# Patient Record
Sex: MALE | Race: NATIVE HAWAIIAN OR OTHER PACIFIC ISLANDER | HISPANIC OR LATINO | URBAN - METROPOLITAN AREA
[De-identification: names, ages, dates, MRNs, and addresses within clinical notes are randomized per-mention and may not be internally consistent; named-entity substitution may affect disease eponyms.]

---

## 2024-03-27 ENCOUNTER — INPATIENT (INPATIENT)
Facility: HOSPITAL | Age: 69
LOS: 3 days | Discharge: HOME | DRG: 872 | End: 2024-03-31
Attending: HOSPITALIST | Admitting: HOSPITALIST
Payer: MEDICAID

## 2024-03-27 VITALS
RESPIRATION RATE: 22 BRPM | WEIGHT: 179.9 LBS | OXYGEN SATURATION: 94 % | DIASTOLIC BLOOD PRESSURE: 77 MMHG | TEMPERATURE: 98 F | SYSTOLIC BLOOD PRESSURE: 134 MMHG | HEART RATE: 127 BPM | HEIGHT: 63 IN

## 2024-03-27 PROCEDURE — 99285 EMERGENCY DEPT VISIT HI MDM: CPT | Mod: 25

## 2024-03-28 DIAGNOSIS — A41.9 SEPSIS, UNSPECIFIED ORGANISM: ICD-10-CM

## 2024-03-28 DIAGNOSIS — E78.5 HYPERLIPIDEMIA, UNSPECIFIED: ICD-10-CM

## 2024-03-28 DIAGNOSIS — I10 ESSENTIAL (PRIMARY) HYPERTENSION: ICD-10-CM

## 2024-03-28 DIAGNOSIS — R10.9 UNSPECIFIED ABDOMINAL PAIN: ICD-10-CM

## 2024-03-28 DIAGNOSIS — K52.9 NONINFECTIVE GASTROENTERITIS AND COLITIS, UNSPECIFIED: ICD-10-CM

## 2024-03-28 LAB
ALBUMIN SERPL ELPH-MCNC: 4.5 G/DL — SIGNIFICANT CHANGE UP (ref 3.3–5)
ALP SERPL-CCNC: 97 U/L — SIGNIFICANT CHANGE UP (ref 40–120)
ALT FLD-CCNC: 86 U/L — HIGH (ref 10–45)
ANION GAP SERPL CALC-SCNC: 19 MMOL/L — HIGH (ref 5–17)
APPEARANCE UR: CLEAR — SIGNIFICANT CHANGE UP
APTT BLD: 29.9 SEC — SIGNIFICANT CHANGE UP (ref 24.5–35.6)
AST SERPL-CCNC: 43 U/L — HIGH (ref 10–40)
BASOPHILS # BLD AUTO: 0.04 K/UL — SIGNIFICANT CHANGE UP (ref 0–0.2)
BASOPHILS NFR BLD AUTO: 0.4 % — SIGNIFICANT CHANGE UP (ref 0–2)
BILIRUB SERPL-MCNC: 1.2 MG/DL — SIGNIFICANT CHANGE UP (ref 0.2–1.2)
BILIRUB UR-MCNC: NEGATIVE — SIGNIFICANT CHANGE UP
BUN SERPL-MCNC: 21 MG/DL — SIGNIFICANT CHANGE UP (ref 7–23)
CALCIUM SERPL-MCNC: 9.4 MG/DL — SIGNIFICANT CHANGE UP (ref 8.4–10.5)
CAMPYLOBACTER DNA SPEC NAA+PROBE: DETECTED
CHLORIDE SERPL-SCNC: 99 MMOL/L — SIGNIFICANT CHANGE UP (ref 96–108)
CO2 SERPL-SCNC: 17 MMOL/L — LOW (ref 22–31)
COLOR SPEC: YELLOW — SIGNIFICANT CHANGE UP
CREAT SERPL-MCNC: 1.36 MG/DL — HIGH (ref 0.5–1.3)
DIFF PNL FLD: NEGATIVE — SIGNIFICANT CHANGE UP
EC EAEA GENE STL QL NAA+PROBE: DETECTED
EGFR: 57 ML/MIN/1.73M2 — LOW
EOSINOPHIL # BLD AUTO: 0 K/UL — SIGNIFICANT CHANGE UP (ref 0–0.5)
EOSINOPHIL NFR BLD AUTO: 0 % — SIGNIFICANT CHANGE UP (ref 0–6)
EPEC DNA STL QL NAA+PROBE: DETECTED
ETEC DNA STL QL NAA+PROBE: DETECTED
FLUAV AG NPH QL: SIGNIFICANT CHANGE UP
FLUBV AG NPH QL: SIGNIFICANT CHANGE UP
GAS PNL BLDV: SIGNIFICANT CHANGE UP
GI PCR PANEL: DETECTED
GLUCOSE SERPL-MCNC: 124 MG/DL — HIGH (ref 70–99)
GLUCOSE UR QL: NEGATIVE MG/DL — SIGNIFICANT CHANGE UP
HCT VFR BLD CALC: 51.7 % — HIGH (ref 39–50)
HGB BLD-MCNC: 18.1 G/DL — HIGH (ref 13–17)
IMM GRANULOCYTES NFR BLD AUTO: 1.2 % — HIGH (ref 0–0.9)
INR BLD: 1.14 RATIO — SIGNIFICANT CHANGE UP (ref 0.85–1.18)
KETONES UR-MCNC: NEGATIVE MG/DL — SIGNIFICANT CHANGE UP
LEUKOCYTE ESTERASE UR-ACNC: NEGATIVE — SIGNIFICANT CHANGE UP
LYMPHOCYTES # BLD AUTO: 0.51 K/UL — LOW (ref 1–3.3)
LYMPHOCYTES # BLD AUTO: 4.7 % — LOW (ref 13–44)
MCHC RBC-ENTMCNC: 31.9 PG — SIGNIFICANT CHANGE UP (ref 27–34)
MCHC RBC-ENTMCNC: 35 GM/DL — SIGNIFICANT CHANGE UP (ref 32–36)
MCV RBC AUTO: 91 FL — SIGNIFICANT CHANGE UP (ref 80–100)
MONOCYTES # BLD AUTO: 0.51 K/UL — SIGNIFICANT CHANGE UP (ref 0–0.9)
MONOCYTES NFR BLD AUTO: 4.7 % — SIGNIFICANT CHANGE UP (ref 2–14)
NEUTROPHILS # BLD AUTO: 9.64 K/UL — HIGH (ref 1.8–7.4)
NEUTROPHILS NFR BLD AUTO: 89 % — HIGH (ref 43–77)
NITRITE UR-MCNC: NEGATIVE — SIGNIFICANT CHANGE UP
NRBC # BLD: 0 /100 WBCS — SIGNIFICANT CHANGE UP (ref 0–0)
PH UR: 5.5 — SIGNIFICANT CHANGE UP (ref 5–8)
PLATELET # BLD AUTO: 236 K/UL — SIGNIFICANT CHANGE UP (ref 150–400)
POTASSIUM SERPL-MCNC: 3.7 MMOL/L — SIGNIFICANT CHANGE UP (ref 3.5–5.3)
POTASSIUM SERPL-SCNC: 3.7 MMOL/L — SIGNIFICANT CHANGE UP (ref 3.5–5.3)
PROT SERPL-MCNC: 7.8 G/DL — SIGNIFICANT CHANGE UP (ref 6–8.3)
PROT UR-MCNC: 30 MG/DL
PROTHROM AB SERPL-ACNC: 12.5 SEC — SIGNIFICANT CHANGE UP (ref 9.5–13)
RBC # BLD: 5.68 M/UL — SIGNIFICANT CHANGE UP (ref 4.2–5.8)
RBC # FLD: 12.5 % — SIGNIFICANT CHANGE UP (ref 10.3–14.5)
RSV RNA NPH QL NAA+NON-PROBE: SIGNIFICANT CHANGE UP
SALMONELLA DNA STL QL NAA+PROBE: DETECTED
SARS-COV-2 RNA SPEC QL NAA+PROBE: SIGNIFICANT CHANGE UP
SODIUM SERPL-SCNC: 135 MMOL/L — SIGNIFICANT CHANGE UP (ref 135–145)
SP GR SPEC: >1.03 — HIGH (ref 1–1.03)
UROBILINOGEN FLD QL: 0.2 MG/DL — SIGNIFICANT CHANGE UP (ref 0.2–1)
WBC # BLD: 10.83 K/UL — HIGH (ref 3.8–10.5)
WBC # FLD AUTO: 10.83 K/UL — HIGH (ref 3.8–10.5)

## 2024-03-28 PROCEDURE — 99222 1ST HOSP IP/OBS MODERATE 55: CPT

## 2024-03-28 PROCEDURE — 74177 CT ABD & PELVIS W/CONTRAST: CPT | Mod: 26,MC

## 2024-03-28 PROCEDURE — 99223 1ST HOSP IP/OBS HIGH 75: CPT

## 2024-03-28 PROCEDURE — 71045 X-RAY EXAM CHEST 1 VIEW: CPT | Mod: 26

## 2024-03-28 RX ORDER — AZITHROMYCIN 500 MG/1
TABLET, FILM COATED ORAL
Refills: 0 | Status: DISCONTINUED | OUTPATIENT
Start: 2024-03-28 | End: 2024-03-31

## 2024-03-28 RX ORDER — LANOLIN ALCOHOL/MO/W.PET/CERES
3 CREAM (GRAM) TOPICAL AT BEDTIME
Refills: 0 | Status: DISCONTINUED | OUTPATIENT
Start: 2024-03-28 | End: 2024-03-31

## 2024-03-28 RX ORDER — AZITHROMYCIN 500 MG/1
500 TABLET, FILM COATED ORAL EVERY 24 HOURS
Refills: 0 | Status: DISCONTINUED | OUTPATIENT
Start: 2024-03-29 | End: 2024-03-31

## 2024-03-28 RX ORDER — SODIUM CHLORIDE 9 MG/ML
2500 INJECTION INTRAMUSCULAR; INTRAVENOUS; SUBCUTANEOUS ONCE
Refills: 0 | Status: DISCONTINUED | OUTPATIENT
Start: 2024-03-28 | End: 2024-03-28

## 2024-03-28 RX ORDER — ACETAMINOPHEN 500 MG
1000 TABLET ORAL ONCE
Refills: 0 | Status: COMPLETED | OUTPATIENT
Start: 2024-03-28 | End: 2024-03-28

## 2024-03-28 RX ORDER — ONDANSETRON 8 MG/1
4 TABLET, FILM COATED ORAL ONCE
Refills: 0 | Status: COMPLETED | OUTPATIENT
Start: 2024-03-28 | End: 2024-03-28

## 2024-03-28 RX ORDER — KETOROLAC TROMETHAMINE 30 MG/ML
15 SYRINGE (ML) INJECTION ONCE
Refills: 0 | Status: DISCONTINUED | OUTPATIENT
Start: 2024-03-28 | End: 2024-03-28

## 2024-03-28 RX ORDER — FAMOTIDINE 10 MG/ML
20 INJECTION INTRAVENOUS ONCE
Refills: 0 | Status: COMPLETED | OUTPATIENT
Start: 2024-03-28 | End: 2024-03-28

## 2024-03-28 RX ORDER — AZITHROMYCIN 500 MG/1
500 TABLET, FILM COATED ORAL ONCE
Refills: 0 | Status: COMPLETED | OUTPATIENT
Start: 2024-03-28 | End: 2024-03-28

## 2024-03-28 RX ORDER — ACETAMINOPHEN 500 MG
650 TABLET ORAL EVERY 6 HOURS
Refills: 0 | Status: DISCONTINUED | OUTPATIENT
Start: 2024-03-28 | End: 2024-03-31

## 2024-03-28 RX ORDER — SODIUM CHLORIDE 9 MG/ML
1000 INJECTION, SOLUTION INTRAVENOUS
Refills: 0 | Status: COMPLETED | OUTPATIENT
Start: 2024-03-28 | End: 2024-03-28

## 2024-03-28 RX ORDER — PIPERACILLIN AND TAZOBACTAM 4; .5 G/20ML; G/20ML
3.38 INJECTION, POWDER, LYOPHILIZED, FOR SOLUTION INTRAVENOUS EVERY 8 HOURS
Refills: 0 | Status: DISCONTINUED | OUTPATIENT
Start: 2024-03-28 | End: 2024-03-28

## 2024-03-28 RX ORDER — INFLUENZA VIRUS VACCINE 15; 15; 15; 15 UG/.5ML; UG/.5ML; UG/.5ML; UG/.5ML
0.7 SUSPENSION INTRAMUSCULAR ONCE
Refills: 0 | Status: DISCONTINUED | OUTPATIENT
Start: 2024-03-28 | End: 2024-03-31

## 2024-03-28 RX ORDER — SODIUM CHLORIDE 9 MG/ML
1000 INJECTION, SOLUTION INTRAVENOUS ONCE
Refills: 0 | Status: COMPLETED | OUTPATIENT
Start: 2024-03-28 | End: 2024-03-28

## 2024-03-28 RX ORDER — ONDANSETRON 8 MG/1
4 TABLET, FILM COATED ORAL EVERY 8 HOURS
Refills: 0 | Status: DISCONTINUED | OUTPATIENT
Start: 2024-03-28 | End: 2024-03-31

## 2024-03-28 RX ORDER — SODIUM CHLORIDE 9 MG/ML
2000 INJECTION INTRAMUSCULAR; INTRAVENOUS; SUBCUTANEOUS ONCE
Refills: 0 | Status: COMPLETED | OUTPATIENT
Start: 2024-03-28 | End: 2024-03-28

## 2024-03-28 RX ORDER — PIPERACILLIN AND TAZOBACTAM 4; .5 G/20ML; G/20ML
3.38 INJECTION, POWDER, LYOPHILIZED, FOR SOLUTION INTRAVENOUS ONCE
Refills: 0 | Status: COMPLETED | OUTPATIENT
Start: 2024-03-28 | End: 2024-03-28

## 2024-03-28 RX ADMIN — Medication 650 MILLIGRAM(S): at 20:24

## 2024-03-28 RX ADMIN — Medication 400 MILLIGRAM(S): at 01:01

## 2024-03-28 RX ADMIN — SODIUM CHLORIDE 1000 MILLILITER(S): 9 INJECTION, SOLUTION INTRAVENOUS at 03:30

## 2024-03-28 RX ADMIN — PIPERACILLIN AND TAZOBACTAM 25 GRAM(S): 4; .5 INJECTION, POWDER, LYOPHILIZED, FOR SOLUTION INTRAVENOUS at 05:58

## 2024-03-28 RX ADMIN — Medication 15 MILLIGRAM(S): at 04:10

## 2024-03-28 RX ADMIN — PIPERACILLIN AND TAZOBACTAM 25 GRAM(S): 4; .5 INJECTION, POWDER, LYOPHILIZED, FOR SOLUTION INTRAVENOUS at 13:47

## 2024-03-28 RX ADMIN — SODIUM CHLORIDE 2000 MILLILITER(S): 9 INJECTION INTRAMUSCULAR; INTRAVENOUS; SUBCUTANEOUS at 01:01

## 2024-03-28 RX ADMIN — PIPERACILLIN AND TAZOBACTAM 200 GRAM(S): 4; .5 INJECTION, POWDER, LYOPHILIZED, FOR SOLUTION INTRAVENOUS at 01:19

## 2024-03-28 RX ADMIN — Medication 650 MILLIGRAM(S): at 20:54

## 2024-03-28 RX ADMIN — Medication 15 MILLIGRAM(S): at 04:40

## 2024-03-28 RX ADMIN — ONDANSETRON 4 MILLIGRAM(S): 8 TABLET, FILM COATED ORAL at 01:01

## 2024-03-28 RX ADMIN — Medication 1000 MILLIGRAM(S): at 01:31

## 2024-03-28 RX ADMIN — AZITHROMYCIN 500 MILLIGRAM(S): 500 TABLET, FILM COATED ORAL at 18:14

## 2024-03-28 RX ADMIN — Medication 650 MILLIGRAM(S): at 12:21

## 2024-03-28 RX ADMIN — Medication 650 MILLIGRAM(S): at 11:51

## 2024-03-28 RX ADMIN — SODIUM CHLORIDE 100 MILLILITER(S): 9 INJECTION, SOLUTION INTRAVENOUS at 05:58

## 2024-03-28 RX ADMIN — FAMOTIDINE 20 MILLIGRAM(S): 10 INJECTION INTRAVENOUS at 01:01

## 2024-03-28 NOTE — ED PROVIDER NOTE - PROGRESS NOTE DETAILS
Elías PGY3: pt remains comfortable, hasn't made any diarrhea yet. +colitis on CT, repeat lact improving. More caroline JENKINS.

## 2024-03-28 NOTE — ED PROVIDER NOTE - OBJECTIVE STATEMENT
67yo [place] speaking M visiting from [facility] with no known pmh presents for vomiting/diarrhea. Yesterday started anorexia, watery diarrhea and abd bloating. All night was moving watery stools, then developed nausea/vomiting so family brought him to ED. mild epigastric pain but no other localizing tpp. +distension. No cp, sob, cough/uri sxs. Arrived from the visit 3/24

## 2024-03-28 NOTE — CONSULT NOTE ADULT - SUBJECTIVE AND OBJECTIVE BOX
Patient is a 68 year old  Occitan speaking male visiting from [facility] with pmh of HTN , HLD , presents for nausea vomiting and diarrhea over the past day.   Per son,   over the past day patient has been having intermittent green non bloody  foul smelling watery diarrhea  associated with poor appetite , decreased oral intake, abdominal cramping and bloating relieved with bowel movement , he also has a few episodes of non bloody emesis during this time. Patient also reports fever and chills as well as lightheadedness. He reports no sick contacts. He recently arrived from [facility] on 3/24.  He had a colonoscopy about 4 months ago reportedly without significant abnormalities.     ED Tmax 101.9 WBC 10.8    UA 11 WBC  Limited RVP neg    GI PCR+ campylobacter, salmonella, EAEC, EPEC, ETEC     Stool cx pending  Stool Ova/cyst pending    Bcx Pending    CT A/P    Wall thickening of the cecum, transverse colon, and descending colon with   fluid throughout the colon suggestive of colitis, possibly   infectious/inflammatory in etiology.    prior hospital charts reviewed [  ]  primary team notes reviewed [x  ]  other consultant notes reviewed [  ]    PAST MEDICAL & SURGICAL HISTORY:  HLD (hyperlipidemia)      HTN (hypertension)      No significant past surgical history          Allergies  No Known Allergies    ANTIMICROBIALS (past 90 days)  MEDICATIONS  (STANDING):  piperacillin/tazobactam IVPB..   25 mL/Hr IV Intermittent (03-28-24 @ 13:47)   25 mL/Hr IV Intermittent (03-28-24 @ 05:58)    piperacillin/tazobactam IVPB...   200 mL/Hr IV Intermittent (03-28-24 @ 01:19)        piperacillin/tazobactam IVPB.. 3.375 every 8 hours    MEDICATIONS  (STANDING):  acetaminophen     Tablet .. 650 every 6 hours PRN  melatonin 3 at bedtime PRN  ondansetron Injectable 4 every 8 hours PRN    SOCIAL HISTORY:       FAMILY HISTORY:  FH: colon cancer (Mother)    FH: prostate cancer (Father)      REVIEW OF SYSTEMS  [  ] ROS unobtainable because:    [  ] All other systems negative except as noted below:	    Constitutional:  [ ] fever [ ] chills  [ ] weight loss  [ ] weakness  Skin:  [ ] rash [ ] phlebitis	  Eyes: [ ] icterus [ ] pain  [ ] discharge	  ENMT: [ ] sore throat  [ ] thrush [ ] ulcers [ ] exudates  Respiratory: [ ] dyspnea [ ] hemoptysis [ ] cough [ ] sputum	  Cardiovascular:  [ ] chest pain [ ] palpitations [ ] edema	  Gastrointestinal:  [ ] nausea [ ] vomiting [ ] diarrhea [ ] constipation [ ] pain	  Genitourinary:  [ ] dysuria [ ] frequency [ ] hematuria [ ] discharge [ ] flank pain  [ ] incontinence  Musculoskeletal:  [ ] myalgias [ ] arthralgias [ ] arthritis  [ ] back pain  Neurological:  [ ] headache [ ] seizures  [ ] confusion/altered mental status  Psychiatric:  [ ] anxiety [ ] depression	  Hematology/Lymphatics:  [ ] lymphadenopathy  Endocrine:  [ ] adrenal [ ] thyroid  Allergic/Immunologic:	 [ ] transplant [ ] seasonal    Vital Signs Last 24 Hrs  T(F): 100.2 (03-28-24 @ 13:13), Max: 101.9 (03-28-24 @ 00:38)  Vital Signs Last 24 Hrs  HR: 96 (03-28-24 @ 13:13) (87 - 127)  BP: 145/78 (03-28-24 @ 13:13) (93/68 - 145/78)  RR: 18 (03-28-24 @ 13:13)  SpO2: 100% (03-28-24 @ 13:13) (94% - 100%)  Wt(kg): --    PHYSICAL EXAM:                              18.1   10.83 )-----------( 236      ( 28 Mar 2024 00:53 )             51.7   03-28    135  |  99  |  21  ----------------------------<  124<H>  3.7   |  17<L>  |  1.36<H>    Ca    9.4      28 Mar 2024 00:53    TPro  7.8  /  Alb  4.5  /  TBili  1.2  /  DBili  x   /  AST  43<H>  /  ALT  86<H>  /  AlkPhos  97  03-28    Urinalysis Basic - ( 28 Mar 2024 02:34 )    Color: Yellow / Appearance: Clear / SG: >1.030 / pH: x  Gluc: x / Ketone: Negative mg/dL  / Bili: Negative / Urobili: 0.2 mg/dL   Blood: x / Protein: 30 mg/dL / Nitrite: Negative   Leuk Esterase: Negative / RBC: 1 /HPF / WBC 11 /HPF   Sq Epi: x / Non Sq Epi: 4 /HPF / Bacteria: Few /HPF    MICROBIOLOGY:              RADIOLOGY:  imaging below personally reviewed and agree with findings    < from: CT Abdomen and Pelvis w/ IV Cont (03.28.24 @ 01:51) >    ACC: [id number] EXAM:  CT ABDOMEN AND PELVIS IC   ORDERED BY: CHEYANNE HEATH     PROCEDURE DATE:  03/28/2024          INTERPRETATION:  CLINICAL INFORMATION: Abdominal pain, vomiting, and   diarrhea.    COMPARISON: None.    CONTRAST/COMPLICATIONS:  IV Contrast: Omnipaque 350  90 cc administered   10 cc discarded  Oral Contrast: NONE  Complications: None reported at time of study completion    PROCEDURE:  CT of the Abdomen and Pelvis was performed.  Sagittal and coronal reformats were performed.    FINDINGS:  LOWER CHEST: Bibasilar atelectasis. 5 mm right middle lobe pulmonary   nodule..    LIVER: Steatosis.  BILE DUCTS: Normal caliber.  GALLBLADDER: Within normal limits.  SPLEEN: Within normal limits.  PANCREAS: Within normal limits.  ADRENALS:Within normal limits.  KIDNEYS/URETERS: Within normal limits.    BLADDER: Minimally distended. Small right posterolateral diverticulum.   Diffuse wall thickening.  REPRODUCTIVE ORGANS: Prostate enlarged, with post TURP changes.    BOWEL: No bowel obstruction. Wall thickening of the cecum, transverse   colon, and descending colon with fluid throughout the colon suggestive of   colitis. Apparent wall thickening of the stomach which may be related to   underdistention versus gastritis. Appendix is normal.  PERITONEUM: No ascites.  VESSELS: Minimal atherosclerotic changes..  RETROPERITONEUM/LYMPH NODES: No lymphadenopathy.  ABDOMINAL WALL: Small fat-containing umbilical hernia.  BONES: Degenerative changes.    IMPRESSION:  Wall thickening of the cecum, transverse colon, and descending colon with   fluid throughout the colon suggestive of colitis, possibly   infectious/inflammatory in etiology.    Apparent wall thickening of the stomach which may be related to   underdistention versus gastritis.    Hepatic steatosis.       Patient is a 68 year old  Turkmen speaking male visiting from [facility] with pmh of HTN , HLD , presents for nausea vomiting and diarrhea for 1 day. He is from [facility] and living in  for 2 years. He is . He was visiting [facility] and returned on 3/24. He ate guinea pig before returning. He has been having intermittent green non bloody  foul smelling watery diarrhea for 2-3 days. > 10 episodes   associated with poor appetite , decreased oral intake, abdominal cramping and bloating relieved with bowel movement , he also has a few episodes of non bloody emesis during this time. Patient also reports fever and chills as well as lightheadedness. He reports no sick contacts. He recently arrived from [facility] on 3/24.  He had a colonoscopy about 4 months ago reportedly without significant abnormalities.     ED Tmax 101.9 WBC 10.8    UA 11 WBC  Limited RVP neg    GI PCR+ campylobacter, salmonella, EAEC, EPEC, ETEC     Stool cx pending  Stool Ova/cyst pending    Bcx Pending    CT A/P    Wall thickening of the cecum, transverse colon, and descending colon with   fluid throughout the colon suggestive of colitis, possibly   infectious/inflammatory in etiology.    prior hospital charts reviewed [  ]  primary team notes reviewed [x  ]  other consultant notes reviewed [  ]    PAST MEDICAL & SURGICAL HISTORY:  HLD (hyperlipidemia)      HTN (hypertension)      No significant past surgical history          Allergies  No Known Allergies    ANTIMICROBIALS (past 90 days)  MEDICATIONS  (STANDING):  piperacillin/tazobactam IVPB..   25 mL/Hr IV Intermittent (03-28-24 @ 13:47)   25 mL/Hr IV Intermittent (03-28-24 @ 05:58)    piperacillin/tazobactam IVPB...   200 mL/Hr IV Intermittent (03-28-24 @ 01:19)        piperacillin/tazobactam IVPB.. 3.375 every 8 hours    MEDICATIONS  (STANDING):  acetaminophen     Tablet .. 650 every 6 hours PRN  melatonin 3 at bedtime PRN  ondansetron Injectable 4 every 8 hours PRN    SOCIAL HISTORY:       FAMILY HISTORY:  FH: colon cancer (Mother)    FH: prostate cancer (Father)      REVIEW OF SYSTEMS  [  ] ROS unobtainable because:    [  ] All other systems negative except as noted below:	    Constitutional:  [ ] fever [ ] chills  [ ] weight loss  [ ] weakness  Skin:  [ ] rash [ ] phlebitis	  Eyes: [ ] icterus [ ] pain  [ ] discharge	  ENMT: [ ] sore throat  [ ] thrush [ ] ulcers [ ] exudates  Respiratory: [ ] dyspnea [ ] hemoptysis [ ] cough [ ] sputum	  Cardiovascular:  [ ] chest pain [ ] palpitations [ ] edema	  Gastrointestinal:  [ ] nausea [ ] vomiting [ ] diarrhea [ ] constipation [ ] pain	  Genitourinary:  [ ] dysuria [ ] frequency [ ] hematuria [ ] discharge [ ] flank pain  [ ] incontinence  Musculoskeletal:  [ ] myalgias [ ] arthralgias [ ] arthritis  [ ] back pain  Neurological:  [ ] headache [ ] seizures  [ ] confusion/altered mental status  Psychiatric:  [ ] anxiety [ ] depression	  Hematology/Lymphatics:  [ ] lymphadenopathy  Endocrine:  [ ] adrenal [ ] thyroid  Allergic/Immunologic:	 [ ] transplant [ ] seasonal    Vital Signs Last 24 Hrs  T(F): 100.2 (03-28-24 @ 13:13), Max: 101.9 (03-28-24 @ 00:38)  Vital Signs Last 24 Hrs  HR: 96 (03-28-24 @ 13:13) (87 - 127)  BP: 145/78 (03-28-24 @ 13:13) (93/68 - 145/78)  RR: 18 (03-28-24 @ 13:13)  SpO2: 100% (03-28-24 @ 13:13) (94% - 100%)  Wt(kg): --    PHYSICAL EXAM:                              18.1   10.83 )-----------( 236      ( 28 Mar 2024 00:53 )             51.7   03-28    135  |  99  |  21  ----------------------------<  124<H>  3.7   |  17<L>  |  1.36<H>    Ca    9.4      28 Mar 2024 00:53    TPro  7.8  /  Alb  4.5  /  TBili  1.2  /  DBili  x   /  AST  43<H>  /  ALT  86<H>  /  AlkPhos  97  03-28    Urinalysis Basic - ( 28 Mar 2024 02:34 )    Color: Yellow / Appearance: Clear / SG: >1.030 / pH: x  Gluc: x / Ketone: Negative mg/dL  / Bili: Negative / Urobili: 0.2 mg/dL   Blood: x / Protein: 30 mg/dL / Nitrite: Negative   Leuk Esterase: Negative / RBC: 1 /HPF / WBC 11 /HPF   Sq Epi: x / Non Sq Epi: 4 /HPF / Bacteria: Few /HPF    MICROBIOLOGY:              RADIOLOGY:  imaging below personally reviewed and agree with findings    < from: CT Abdomen and Pelvis w/ IV Cont (03.28.24 @ 01:51) >    ACC: [id number] EXAM:  CT ABDOMEN AND PELVIS IC   ORDERED BY: CHEYANNE HEATH     PROCEDURE DATE:  03/28/2024          INTERPRETATION:  CLINICAL INFORMATION: Abdominal pain, vomiting, and   diarrhea.    COMPARISON: None.    CONTRAST/COMPLICATIONS:  IV Contrast: Omnipaque 350  90 cc administered   10 cc discarded  Oral Contrast: NONE  Complications: None reported at time of study completion    PROCEDURE:  CT of the Abdomen and Pelvis was performed.  Sagittal and coronal reformats were performed.    FINDINGS:  LOWER CHEST: Bibasilar atelectasis. 5 mm right middle lobe pulmonary   nodule..    LIVER: Steatosis.  BILE DUCTS: Normal caliber.  GALLBLADDER: Within normal limits.  SPLEEN: Within normal limits.  PANCREAS: Within normal limits.  ADRENALS:Within normal limits.  KIDNEYS/URETERS: Within normal limits.    BLADDER: Minimally distended. Small right posterolateral diverticulum.   Diffuse wall thickening.  REPRODUCTIVE ORGANS: Prostate enlarged, with post TURP changes.    BOWEL: No bowel obstruction. Wall thickening of the cecum, transverse   colon, and descending colon with fluid throughout the colon suggestive of   colitis. Apparent wall thickening of the stomach which may be related to   underdistention versus gastritis. Appendix is normal.  PERITONEUM: No ascites.  VESSELS: Minimal atherosclerotic changes..  RETROPERITONEUM/LYMPH NODES: No lymphadenopathy.  ABDOMINAL WALL: Small fat-containing umbilical hernia.  BONES: Degenerative changes.    IMPRESSION:  Wall thickening of the cecum, transverse colon, and descending colon with   fluid throughout the colon suggestive of colitis, possibly   infectious/inflammatory in etiology.    Apparent wall thickening of the stomach which may be related to   underdistention versus gastritis.    Hepatic steatosis.       Patient is a 68 year old  Turkmen speaking male visiting from [facility] with pmh of HTN , HLD , presents for nausea vomiting and diarrhea for 1 day. He is from [facility] and living in [place] for 2 years. He is . He was visiting [facility] and returned on 3/24. He ate guinea pig before returning. He has been having intermittent green non bloody  foul smelling watery diarrhea for 2-3 days. > 10 episodes initially today 7-8 episodes. It is associated with poor appetite , decreased oral intake, abdominal cramping and bloating relieved with bowel movement , he also has a few episodes of non bloody emesis during this time. Patient also reports fever and chills as well as lightheadedness. He reports no sick contacts. He recently arrived from [facility] on 3/24.  He had a colonoscopy about 4 months ago reportedly without significant abnormalities.     ED Tmax 101.9 WBC 10.8    UA 11 WBC  Limited RVP neg    GI PCR+ campylobacter, salmonella, EAEC, EPEC, ETEC     Stool cx pending  Stool Ova/cyst pending    Bcx Pending    CT A/P    Wall thickening of the cecum, transverse colon, and descending colon with   fluid throughout the colon suggestive of colitis, possibly   infectious/inflammatory in etiology.    prior hospital charts reviewed [  ]  primary team notes reviewed [x  ]  other consultant notes reviewed [  ]    PAST MEDICAL & SURGICAL HISTORY:  HLD (hyperlipidemia)      HTN (hypertension)      No significant past surgical history          Allergies  No Known Allergies    ANTIMICROBIALS (past 90 days)  MEDICATIONS  (STANDING):  piperacillin/tazobactam IVPB..   25 mL/Hr IV Intermittent (03-28-24 @ 13:47)   25 mL/Hr IV Intermittent (03-28-24 @ 05:58)    piperacillin/tazobactam IVPB...   200 mL/Hr IV Intermittent (03-28-24 @ 01:19)        piperacillin/tazobactam IVPB.. 3.375 every 8 hours    MEDICATIONS  (STANDING):  acetaminophen     Tablet .. 650 every 6 hours PRN  melatonin 3 at bedtime PRN  ondansetron Injectable 4 every 8 hours PRN    SOCIAL HISTORY: [employer]id[employer], lives with family, no tobacco, drug use      FAMILY HISTORY:  FH: colon cancer (Mother)    FH: prostate cancer (Father)      REVIEW OF SYSTEMS  [  ] ROS unobtainable because:    [  x] All other systems negative except as noted below:	    Constitutional:  x[ x] fever [x ] chills  [ ] weight loss  [ ] weakness  Skin:  [ ] rash [ ] phlebitis	  Eyes: [ ] icterus [ ] pain  [ ] discharge	  ENMT: [ ] sore throat  [ ] thrush [ ] ulcers [ ] exudates  Respiratory: [ ] dyspnea [ ] hemoptysis [ ] cough [ ] sputum	  Cardiovascular:  [ ] chest pain [ ] palpitations [ ] edema	  Gastrointestinal:  [ ] nausea [x ] vomiting [x ] diarrhea [ ] constipation [x ] pain	  Genitourinary:  [ ] dysuria [ ] frequency [ ] hematuria [ ] discharge [ ] flank pain  [ ] incontinence  Musculoskeletal:  [ ] myalgias [ ] arthralgias [ ] arthritis  [ ] back pain  Neurological:  [ ] headache [ ] seizures  [ ] confusion/altered mental status  Psychiatric:  [ ] anxiety [ ] depression	  Hematology/Lymphatics:  [ ] lymphadenopathy  Endocrine:  [ ] adrenal [ ] thyroid  Allergic/Immunologic:	 [ ] transplant [ ] seasonal    Vital Signs Last 24 Hrs  T(F): 100.2 (03-28-24 @ 13:13), Max: 101.9 (03-28-24 @ 00:38)  Vital Signs Last 24 Hrs  HR: 96 (03-28-24 @ 13:13) (87 - 127)  BP: 145/78 (03-28-24 @ 13:13) (93/68 - 145/78)  RR: 18 (03-28-24 @ 13:13)  SpO2: 100% (03-28-24 @ 13:13) (94% - 100%)  Wt(kg): --    PHYSICAL EXAM:    General: Patient in NAD  HEENT: NCAT, EOMI, PERRL, no oral lesions  CV: S1+S2, no m/r/g appreciated   Lungs: No respiratory distress, CTAB  Abd: Soft, mild gen tenderness on deep palpation, no guarding, no rebound tenderness, + bowel sounds   : No suprapubic tenderness  Neuro: Alert and oriented to time, place and person. Moves all extremities against gravity.  Ext: No cyanosis, no edema  Skin: No rash, no phlebitis                              18.1   10.83 )-----------( 236      ( 28 Mar 2024 00:53 )             51.7   03-28    135  |  99  |  21  ----------------------------<  124<H>  3.7   |  17<L>  |  1.36<H>    Ca    9.4      28 Mar 2024 00:53    TPro  7.8  /  Alb  4.5  /  TBili  1.2  /  DBili  x   /  AST  43<H>  /  ALT  86<H>  /  AlkPhos  97  03-28    Urinalysis Basic - ( 28 Mar 2024 02:34 )    Color: Yellow / Appearance: Clear / SG: >1.030 / pH: x  Gluc: x / Ketone: Negative mg/dL  / Bili: Negative / Urobili: 0.2 mg/dL   Blood: x / Protein: 30 mg/dL / Nitrite: Negative   Leuk Esterase: Negative / RBC: 1 /HPF / WBC 11 /HPF   Sq Epi: x / Non Sq Epi: 4 /HPF / Bacteria: Few /HPF    MICROBIOLOGY:              RADIOLOGY:  imaging below personally reviewed and agree with findings    < from: CT Abdomen and Pelvis w/ IV Cont (03.28.24 @ 01:51) >    ACC: [id number] EXAM:  CT ABDOMEN AND PELVIS IC   ORDERED BY: CHEYANNE HEATH     PROCEDURE DATE:  03/28/2024          INTERPRETATION:  CLINICAL INFORMATION: Abdominal pain, vomiting, and   diarrhea.    COMPARISON: None.    CONTRAST/COMPLICATIONS:  IV Contrast: Omnipaque 350  90 cc administered   10 cc discarded  Oral Contrast: NONE  Complications: None reported at time of study completion    PROCEDURE:  CT of the Abdomen and Pelvis was performed.  Sagittal and coronal reformats were performed.    FINDINGS:  LOWER CHEST: Bibasilar atelectasis. 5 mm right middle lobe pulmonary   nodule..    LIVER: Steatosis.  BILE DUCTS: Normal caliber.  GALLBLADDER: Within normal limits.  SPLEEN: Within normal limits.  PANCREAS: Within normal limits.  ADRENALS:Within normal limits.  KIDNEYS/URETERS: Within normal limits.    BLADDER: Minimally distended. Small right posterolateral diverticulum.   Diffuse wall thickening.  REPRODUCTIVE ORGANS: Prostate enlarged, with post TURP changes.    BOWEL: No bowel obstruction. Wall thickening of the cecum, transverse   colon, and descending colon with fluid throughout the colon suggestive of   colitis. Apparent wall thickening of the stomach which may be related to   underdistention versus gastritis. Appendix is normal.  PERITONEUM: No ascites.  VESSELS: Minimal atherosclerotic changes..  RETROPERITONEUM/LYMPH NODES: No lymphadenopathy.  ABDOMINAL WALL: Small fat-containing umbilical hernia.  BONES: Degenerative changes.    IMPRESSION:  Wall thickening of the cecum, transverse colon, and descending colon with   fluid throughout the colon suggestive of colitis, possibly   infectious/inflammatory in etiology.    Apparent wall thickening of the stomach which may be related to   underdistention versus gastritis.    Hepatic steatosis.       Patient is a 68 year old  Korean speaking male visiting from [facility] with pmh of HTN , HLD , presents for nausea vomiting and diarrhea for 1 day. He is from [facility] and living in [place] for 2 years. He is . He was visiting [facility] and returned on 3/24. He ate guinea pig before returning. He has been having intermittent green non bloody  foul smelling watery diarrhea for 2-3 days. > 10 episodes initially today 7-8 episodes. It is associated with poor appetite , decreased oral intake, abdominal cramping and bloating relieved with bowel movement , he also has a few episodes of non bloody emesis during this time. Patient also reports fever and chills as well as lightheadedness. He reports no sick contacts. He recently arrived from [facility] on 3/24.  He had a colonoscopy about 4 months ago reportedly without significant abnormalities.     ED Tmax 101.9 WBC 10.8    UA 11 WBC  Limited RVP neg    GI PCR+ campylobacter, salmonella, EAEC, EPEC, ETEC     Stool cx pending  Stool Ova/cyst pending    Bcx Pending    CT A/P    Wall thickening of the cecum, transverse colon, and descending colon with   fluid throughout the colon suggestive of colitis, possibly   infectious/inflammatory in etiology.    prior hospital charts reviewed [  ]  primary team notes reviewed [x  ]  other consultant notes reviewed [  ]    PAST MEDICAL & SURGICAL HISTORY:  HLD (hyperlipidemia)      HTN (hypertension)      No significant past surgical history          Allergies  No Known Allergies    ANTIMICROBIALS (past 90 days)  MEDICATIONS  (STANDING):  piperacillin/tazobactam IVPB..   25 mL/Hr IV Intermittent (03-28-24 @ 13:47)   25 mL/Hr IV Intermittent (03-28-24 @ 05:58)    piperacillin/tazobactam IVPB...   200 mL/Hr IV Intermittent (03-28-24 @ 01:19)        piperacillin/tazobactam IVPB.. 3.375 every 8 hours    MEDICATIONS  (STANDING):  acetaminophen     Tablet .. 650 every 6 hours PRN  melatonin 3 at bedtime PRN  ondansetron Injectable 4 every 8 hours PRN    SOCIAL HISTORY: from [facility], [place], lives with family, no tobacco, drug use    recent trip to [facility], came back 3/24    FAMILY HISTORY:  FH: colon cancer (Mother)    FH: prostate cancer (Father)      REVIEW OF SYSTEMS  [  ] ROS unobtainable because:    [  x] All other systems negative except as noted below:	    Constitutional:  x[ x] fever [x ] chills  [ ] weight loss  [ ] weakness  Skin:  [ ] rash [ ] phlebitis	  Eyes: [ ] icterus [ ] pain  [ ] discharge	  ENMT: [ ] sore throat  [ ] thrush [ ] ulcers [ ] exudates  Respiratory: [ ] dyspnea [ ] hemoptysis [ ] cough [ ] sputum	  Cardiovascular:  [ ] chest pain [ ] palpitations [ ] edema	  Gastrointestinal:  [ ] nausea [x ] vomiting [x ] diarrhea [ ] constipation [x ] pain	  Genitourinary:  [ ] dysuria [ ] frequency [ ] hematuria [ ] discharge [ ] flank pain  [ ] incontinence  Musculoskeletal:  [ ] myalgias [ ] arthralgias [ ] arthritis  [ ] back pain  Neurological:  [ ] headache [ ] seizures  [ ] confusion/altered mental status  Psychiatric:  [ ] anxiety [ ] depression	  Hematology/Lymphatics:  [ ] lymphadenopathy  Endocrine:  [ ] adrenal [ ] thyroid  Allergic/Immunologic:	 [ ] transplant [ ] seasonal    Vital Signs Last 24 Hrs  T(F): 100.2 (03-28-24 @ 13:13), Max: 101.9 (03-28-24 @ 00:38)  Vital Signs Last 24 Hrs  HR: 96 (03-28-24 @ 13:13) (87 - 127)  BP: 145/78 (03-28-24 @ 13:13) (93/68 - 145/78)  RR: 18 (03-28-24 @ 13:13)  SpO2: 100% (03-28-24 @ 13:13) (94% - 100%)  Wt(kg): --    PHYSICAL EXAM:    General: Patient in NAD  Head: atraumatic, normocephalic  Eyes: anicteric  ENT: NCAT, EOMI, PERRL, no oral lesions  CV: S1+S2, no m/r/g appreciated   Lungs: No respiratory distress, CTAB  Abd: Soft, mild gen tenderness on deep palpation, no guarding, no rebound tenderness, + bowel sounds   : No suprapubic tenderness  Neuro: Alert and oriented to time, place and person. Moves all extremities against gravity.  Ext: No cyanosis, no edema  Skin: No rash  vascular: no phlebitis  psych: normal affect                            18.1   10.83 )-----------( 236      ( 28 Mar 2024 00:53 )             51.7   03-28    135  |  99  |  21  ----------------------------<  124<H>  3.7   |  17<L>  |  1.36<H>    Ca    9.4      28 Mar 2024 00:53    TPro  7.8  /  Alb  4.5  /  TBili  1.2  /  DBili  x   /  AST  43<H>  /  ALT  86<H>  /  AlkPhos  97  03-28    Urinalysis Basic - ( 28 Mar 2024 02:34 )    Color: Yellow / Appearance: Clear / SG: >1.030 / pH: x  Gluc: x / Ketone: Negative mg/dL  / Bili: Negative / Urobili: 0.2 mg/dL   Blood: x / Protein: 30 mg/dL / Nitrite: Negative   Leuk Esterase: Negative / RBC: 1 /HPF / WBC 11 /HPF   Sq Epi: x / Non Sq Epi: 4 /HPF / Bacteria: Few /HPF    MICROBIOLOGY:              RADIOLOGY:  imaging below personally reviewed and agree with findings    < from: CT Abdomen and Pelvis w/ IV Cont (03.28.24 @ 01:51) >    ACC: [id number] EXAM:  CT ABDOMEN AND PELVIS IC   ORDERED BY: CHEYANNE HEATH     PROCEDURE DATE:  03/28/2024          INTERPRETATION:  CLINICAL INFORMATION: Abdominal pain, vomiting, and   diarrhea.    COMPARISON: None.    CONTRAST/COMPLICATIONS:  IV Contrast: Omnipaque 350  90 cc administered   10 cc discarded  Oral Contrast: NONE  Complications: None reported at time of study completion    PROCEDURE:  CT of the Abdomen and Pelvis was performed.  Sagittal and coronal reformats were performed.    FINDINGS:  LOWER CHEST: Bibasilar atelectasis. 5 mm right middle lobe pulmonary   nodule..    LIVER: Steatosis.  BILE DUCTS: Normal caliber.  GALLBLADDER: Within normal limits.  SPLEEN: Within normal limits.  PANCREAS: Within normal limits.  ADRENALS:Within normal limits.  KIDNEYS/URETERS: Within normal limits.    BLADDER: Minimally distended. Small right posterolateral diverticulum.   Diffuse wall thickening.  REPRODUCTIVE ORGANS: Prostate enlarged, with post TURP changes.    BOWEL: No bowel obstruction. Wall thickening of the cecum, transverse   colon, and descending colon with fluid throughout the colon suggestive of   colitis. Apparent wall thickening of the stomach which may be related to   underdistention versus gastritis. Appendix is normal.  PERITONEUM: No ascites.  VESSELS: Minimal atherosclerotic changes..  RETROPERITONEUM/LYMPH NODES: No lymphadenopathy.  ABDOMINAL WALL: Small fat-containing umbilical hernia.  BONES: Degenerative changes.    IMPRESSION:  Wall thickening of the cecum, transverse colon, and descending colon with   fluid throughout the colon suggestive of colitis, possibly   infectious/inflammatory in etiology.    Apparent wall thickening of the stomach which may be related to   underdistention versus gastritis.    Hepatic steatosis.

## 2024-03-28 NOTE — ED ADULT NURSE NOTE - OBJECTIVE STATEMENT
pt is a 67yo male PMH HTN, HLD presenting to the ED complaining of abdominal pain. pt Serbian speaking, son at bedside to translate per pt request, reports pt began experiencing diffuse abdominal pain and diarrhea yesterday, no relief with pepto bismol, pt reports new onset of nausea, vomiting, generalized weakness, HA, chills, subjective fevers, and decreased PO intake today. pt unable to take medications due to n/v, pt son provided pt with pedialyte but pt vomited following intake. pt A&Ox3 gross neuro intact, no difficulty speaking in complete sentences, pulses x 4, altamirano x4, abdomen soft, appears nondistended, skin intact. pt denies chest pain, sob, urinary symptoms, hematuria

## 2024-03-28 NOTE — H&P ADULT - NSICDXFAMILYHX_GEN_ALL_CORE_FT
FAMILY HISTORY:  Father  Still living? Unknown  FH: prostate cancer, Age at diagnosis: Age Unknown    Mother  Still living? Unknown  FH: colon cancer, Age at diagnosis: Age Unknown

## 2024-03-28 NOTE — H&P ADULT - NSHPREVIEWOFSYSTEMS_GEN_ALL_CORE
CONSTITUTIONAL: No weakness, fevers or chills  EYES/ENT: No visual changes;  No dysphagia  NECK: No pain or stiffness  RESPIRATORY: No cough, wheezing, hemoptysis; No shortness of breath  CARDIOVASCULAR: No chest pain or palpitations; No lower extremity edema  EXTREMITIES: no le edema, cyanosis, clubbing  GASTROINTESTINAL: + lower  abdominal pain. +  nausea, vomiting, or hematemesis; +  diarrhea  no constipation. No melena or hematochezia.  BACK: No back pain  GENITOURINARY: No dysuria, frequency or hematuria  NEUROLOGICAL: No numbness or weakness  MSK: no joint swelling or pain  SKIN: No itching, burning, rashes, or lesions   PSYCH: no agitation  All other review of systems is negative unless indicated above.

## 2024-03-28 NOTE — ED PROVIDER NOTE - CLINICAL SUMMARY MEDICAL DECISION MAKING FREE TEXT BOX
Elías PGY3: 67yo Greek speaking M visiting from [facility] with no known pmh presents for fever, abd pain, NVD after recently arrived from [facility] w/ grossly soft abd. Consider diverticulitis vs appendix vs dominique vs uti vs travellers diarrhea. sepsis, abbx, ivf, fever control, gi pcr. ct /ap. Elías PGY3: 69yo Bahraini speaking M visiting from [facility] with no known pmh presents for fever, abd pain, NVD after recently arrived from [facility] w/ grossly soft abd. Consider diverticulitis vs appendix vs dominique vs uti vs travellers diarrhea. sepsis, abbx, ivf, fever control, gi pcr. ct /ap.    pettet attending- see attending attestation for my mdm

## 2024-03-28 NOTE — H&P ADULT - NSHPLABSRESULTS_GEN_ALL_CORE
Labs personally reviewed:                          18.1   10.83 )-----------( 236      ( 28 Mar 2024 00:53 )             51.7     03-28    135  |  99  |  21  ----------------------------<  124<H>  3.7   |  17<L>  |  1.36<H>    Ca    9.4      28 Mar 2024 00:53    TPro  7.8  /  Alb  4.5  /  TBili  1.2  /  DBili  x   /  AST  43<H>  /  ALT  86<H>  /  AlkPhos  97  03-28        LIVER FUNCTIONS - ( 28 Mar 2024 00:53 )  Alb: 4.5 g/dL / Pro: 7.8 g/dL / ALK PHOS: 97 U/L / ALT: 86 U/L / AST: 43 U/L / GGT: x           PT/INR - ( 28 Mar 2024 00:53 )   PT: 12.5 sec;   INR: 1.14 ratio         PTT - ( 28 Mar 2024 00:53 )  PTT:29.9 sec  Urinalysis Basic - ( 28 Mar 2024 02:34 )    Color: Yellow / Appearance: Clear / SG: >1.030 / pH: x  Gluc: x / Ketone: Negative mg/dL  / Bili: Negative / Urobili: 0.2 mg/dL   Blood: x / Protein: 30 mg/dL / Nitrite: Negative   Leuk Esterase: Negative / RBC: 1 /HPF / WBC 11 /HPF   Sq Epi: x / Non Sq Epi: 4 /HPF / Bacteria: Few /HPF      CAPILLARY BLOOD GLUCOSE          Imaging:  CXR personally reviewed: no focal opacity  CT Ap: Wall thickening of the cecum, transverse colon, and descending colon with   fluid throughout the colon suggestive of colitis, possibly   infectious/inflammatory in etiology.    Apparent wall thickening of the stomach which may be related to   underdistention versus gastritis.    Hepatic steatosis.      EKG personally reviewed: Labs personally reviewed:                          18.1   10.83 )-----------( 236      ( 28 Mar 2024 00:53 )             51.7     03-28    135  |  99  |  21  ----------------------------<  124<H>  3.7   |  17<L>  |  1.36<H>    Ca    9.4      28 Mar 2024 00:53    TPro  7.8  /  Alb  4.5  /  TBili  1.2  /  DBili  x   /  AST  43<H>  /  ALT  86<H>  /  AlkPhos  97  03-28        LIVER FUNCTIONS - ( 28 Mar 2024 00:53 )  Alb: 4.5 g/dL / Pro: 7.8 g/dL / ALK PHOS: 97 U/L / ALT: 86 U/L / AST: 43 U/L / GGT: x           PT/INR - ( 28 Mar 2024 00:53 )   PT: 12.5 sec;   INR: 1.14 ratio         PTT - ( 28 Mar 2024 00:53 )  PTT:29.9 sec  Urinalysis Basic - ( 28 Mar 2024 02:34 )    Color: Yellow / Appearance: Clear / SG: >1.030 / pH: x  Gluc: x / Ketone: Negative mg/dL  / Bili: Negative / Urobili: 0.2 mg/dL   Blood: x / Protein: 30 mg/dL / Nitrite: Negative   Leuk Esterase: Negative / RBC: 1 /HPF / WBC 11 /HPF   Sq Epi: x / Non Sq Epi: 4 /HPF / Bacteria: Few /HPF      CAPILLARY BLOOD GLUCOSE          Imaging:  CXR personally reviewed: no focal opacity  CT Ap: Wall thickening of the cecum, transverse colon, and descending colon with   fluid throughout the colon suggestive of colitis, possibly   infectious/inflammatory in etiology.    Apparent wall thickening of the stomach which may be related to   underdistention versus gastritis.    Hepatic steatosis.      EKG personally reviewed: sinus tachycardia at 107 bpm , twi in v3-4

## 2024-03-28 NOTE — ED PROVIDER NOTE - PHYSICAL EXAMINATION
CONSTITUTIONAL: tired appearing, no active distress  SKIN: hot diaphoretic to touch  HEAD: NCAT  EYES: NL inspection  ENT: dry oral mucosa   NECK: Supple  CARD: tachycardic   RESP: CTAB  ABD: distended, mildly ttp epigastrium, no guarding/rebound   EXT: no LE edema  NEURO: Grossly non-focal   PSYCH: Cooperative, appropriate.

## 2024-03-28 NOTE — H&P ADULT - HISTORY OF PRESENT ILLNESS
Patient declined to use phone  , preferred son provides history and translation  Patient is a 68 year old  Honduran speaking male visiting from [facility] with pmh of HTN , HLD , presents for nausea vomiting and diarrhea over the past day.   Per son,   over the past day patient has been having intermittent green non bloody  foul smelling watery diarrhea  associated with poor appetite , decreased oral intake, abdominal cramping and bloating relieved with bowel movement , he also has a few episodes of non bloody emesis during this time. Patient also reports fever and chills as well as lightheadedness. He reports no sick contacts. He recently arrived from [facility] on 3/24.  He had a colonoscopy about 4 months ago reportedly without significant abnormalities.

## 2024-03-28 NOTE — H&P ADULT - NSHPPHYSICALEXAM_GEN_ALL_CORE
Vital Signs Last 24 Hrs  T(C): 37.5 (28 Mar 2024 04:05), Max: 38.8 (28 Mar 2024 00:38)  T(F): 99.5 (28 Mar 2024 04:05), Max: 101.9 (28 Mar 2024 00:38)  HR: 88 (28 Mar 2024 04:05) (88 - 127)  BP: 115/69 (28 Mar 2024 04:05) (93/68 - 134/77)  BP(mean): 77 (28 Mar 2024 02:11) (77 - 77)  RR: 18 (28 Mar 2024 04:05) (18 - 22)  SpO2: 96% (28 Mar 2024 04:05) (94% - 97%)    Parameters below as of 28 Mar 2024 04:05  Patient On (Oxygen Delivery Method): room air Vital Signs Last 24 Hrs  T(C): 37.5 (28 Mar 2024 04:05), Max: 38.8 (28 Mar 2024 00:38)  T(F): 99.5 (28 Mar 2024 04:05), Max: 101.9 (28 Mar 2024 00:38)  HR: 88 (28 Mar 2024 04:05) (88 - 127)  BP: 115/69 (28 Mar 2024 04:05) (93/68 - 134/77)  BP(mean): 77 (28 Mar 2024 02:11) (77 - 77)  RR: 18 (28 Mar 2024 04:05) (18 - 22)  SpO2: 96% (28 Mar 2024 04:05) (94% - 97%)    Parameters below as of 28 Mar 2024 04:05  Patient On (Oxygen Delivery Method): room air    GENERAL: No acute distress, well-developed  HEAD:  Atraumatic, Normocephalic  ENT: EOMI, PERRLA, conjunctiva and sclera clear,  moist mucosa no pharyngeal erythema or exudates   NECK: supple , no JVD   CHEST/LUNG: Clear to auscultation bilaterally; No wheeze, equal breath sounds bilaterally   BACK: No spinal tenderness,  No CVA tenderness   HEART: Regular rate and rhythm; No murmurs, rubs, or gallops  ABDOMEN: Soft,  mild tenderness , + distended ; Bowel sounds present  EXTREMITIES:  No clubbing, cyanosis, or edema  MSK: No joint swelling or effusions, ROM intact   PSYCH: Normal behavior/affect  NEUROLOGY: AAOx3, non-focal, cranial nerves intact  SKIN: Normal color, No rashes or lesions

## 2024-03-28 NOTE — ED ADULT NURSE NOTE - PRO INTERPRETER NEED 2
Left message for patient to call back   
Patient came in to drop off tests results to be reviewed by Dr. Wilson.     Will leave in mailbox  
Please refer to 1/10/2023 e-advice .  
Reviewed.  Patient should continue pantoprazole.
Singaporean

## 2024-03-28 NOTE — ED ADULT NURSE NOTE - NSFALLUNIVINTERV_ED_ALL_ED
Bed/Stretcher in lowest position, wheels locked, appropriate side rails in place/Call bell, personal items and telephone in reach/Instruct patient to call for assistance before getting out of bed/chair/stretcher/Non-slip footwear applied when patient is off stretcher/[place] to call system/Physically safe environment - no spills, clutter or unnecessary equipment/Purposeful proactive rounding/Room/bathroom lighting operational, light cord in reach

## 2024-03-28 NOTE — CONSULT NOTE ADULT - ASSESSMENT
Patient is a 68 year old  Stateless speaking male visiting from [facility] with pmh of HTN , HLD , presents for nausea vomiting and diarrhea over the past day.   Per son,   over the past day patient has been having intermittent green non bloody  foul smelling watery diarrhea  associated with poor appetite , decreased oral intake, abdominal cramping and bloating relieved with bowel movement , he also has a few episodes of non bloody emesis during this time. Patient also reports fever and chills as well as lightheadedness. He reports no sick contacts. He recently arrived from [facility] on 3/24.  He had a colonoscopy about 4 months ago reportedly without significant abnormalities.     ED Tmax 101.9 WBC 10.8    UA 11 WBC  Limited RVP neg    GI PCR+ campylobacter, salmonella, EAEC, EPEC, ETEC     Stool cx pending  Stool Ova/cyst pending    Bcx Pending    CT A/P    Wall thickening of the cecum, transverse colon, and descending colon with   fluid throughout the colon suggestive of colitis, possibly   infectious/inflammatory in etiology.    [facility]    Patient to be seen. Patient is a 68 year old  Botswanan speaking male visiting from [facility] with pmh of HTN , HLD , presents for nausea vomiting and diarrhea over the past day.   Per son,   over the past day patient has been having intermittent green non bloody  foul smelling watery diarrhea  associated with poor appetite , decreased oral intake, abdominal cramping and bloating relieved with bowel movement , he also has a few episodes of non bloody emesis during this time. Patient also reports fever and chills as well as lightheadedness. He reports no sick contacts. He recently arrived from [facility] on 3/24.  He had a colonoscopy about 4 months ago reportedly without significant abnormalities.     ED Tmax 101.9 WBC 10.8    UA 11 WBC  Limited RVP neg    GI PCR+ campylobacter, salmonella, EAEC, EPEC, ETEC     Stool cx pending  Stool Ova/cyst pending    Bcx Pending    CT A/P    Wall thickening of the cecum, transverse colon, and descending colon with   fluid throughout the colon suggestive of colitis, possibly   infectious/inflammatory in etiology.      # Fevers  # Infectious colitis; severe symptoms  # GI PCR with polymicrobial positivity    Can stop zosyn  Would treat with Azithromycin 500 mg PO X 3 days  follow up blood stool cultures, stool ova& cysts    Discussed with attending    Elder Ramirez MD, PGY-4  ID Fellow  [employer] Teams Preferred  After 5pm/weekends call [phone]      Botswanan interpretor # [id number] used for interview

## 2024-03-28 NOTE — H&P ADULT - PROBLEM SELECTOR PLAN 2
presumed GI source , CXR clear , RVP neg , no other localizing symptoms    - c/w zosyn   - f/u blood and urine cultures   - GI w/u per above   - s/p 3L IV fluid bolus , additional maintenance fluids

## 2024-03-28 NOTE — ED PROVIDER NOTE - ATTENDING CONTRIBUTION TO CARE
I, Corbin Roy, performed a history and physical exam of the patient and discussed their management with the resident and/or advanced care provider. I reviewed the resident and/or advanced care provider's note and agree with the documented findings and plan of care. I was present and available for all procedures.    67yo Ukrainian speaking M visiting from [facility] with no known pmh presents for vomiting/diarrhea. Yesterday started anorexia, watery diarrhea and abd bloating. All night was moving watery stools, then developed nausea/vomiting so family brought him to ED. mild epigastric pain but no other localizing tpp. +distension. No cp, sob, cough/uri sxs. Arrived from the visit 3/24    Well appearing and in NAD, head normal appearing atraumatic, trachea midline, no respiratory distress, lungs cta bilaterally, tachycardic no murmurs, soft diffuse mild ttp no rt, ND abdomen, no visible extremity deformities, Alert and oriented, non focal neuro exam, skin warm and dry, normal affect and mood, no leg swelling, calf ttp or jvd    patient with diarrheal sepsis abd pain eval with screening labs ctap analgesia ivf unlikely acs pe ptx pna dissection aaa discussed with son as well as bedside agreed w plan dispo for admit

## 2024-03-28 NOTE — CHART NOTE - NSCHARTNOTEFT_GEN_A_CORE
son at bedside, translation declined  patient seen and examined  still having diarrhea, LLQ ab pain  low grade fever    c/w zosyn  f/u infectious w/u    cont to monitor son at bedside, translation declined  patient seen and examined  still having diarrhea, LLQ ab pain  low grade fever    c/w zosyn  f/u infectious w/u    cont to monitor      addendum 3pm - noted GI PCR positive for multiple organisms ETEC, EPEC, EAC, Salmonella, Campylobacter - ID consulted, sending stool cultures son at bedside, translation declined  patient seen and examined  still having diarrhea, LLQ ab pain  low grade fever    c/w zosyn  f/u infectious w/u    cont to monitor      addendum 3pm - noted GI PCR positive for multiple organisms ETEC, EPEC, EAC, Salmonella, Campylobacter - ID consulted, sending stool cultures    will likely need outpatient GI referral for interval colonoscopy if not sooner eval

## 2024-03-28 NOTE — H&P ADULT - PROBLEM SELECTOR PLAN 4
family unable to provide specific details of medications , will bring in list later   - medications to be reconciled by day team once list is available

## 2024-03-28 NOTE — H&P ADULT - ASSESSMENT
68 year old  New Zealander speaking male visiting from [facility] with pmh of HTN , HLD , presents for nausea vomiting and diarrhea over the past day.

## 2024-03-29 DIAGNOSIS — N17.9 ACUTE KIDNEY FAILURE, UNSPECIFIED: ICD-10-CM

## 2024-03-29 DIAGNOSIS — Z29.9 ENCOUNTER FOR PROPHYLACTIC MEASURES, UNSPECIFIED: ICD-10-CM

## 2024-03-29 DIAGNOSIS — K92.1 MELENA: ICD-10-CM

## 2024-03-29 LAB
ALBUMIN SERPL ELPH-MCNC: 3.8 G/DL — SIGNIFICANT CHANGE UP (ref 3.3–5)
ALP SERPL-CCNC: 69 U/L — SIGNIFICANT CHANGE UP (ref 40–120)
ALT FLD-CCNC: 77 U/L — HIGH (ref 10–45)
ANION GAP SERPL CALC-SCNC: 14 MMOL/L — SIGNIFICANT CHANGE UP (ref 5–17)
AST SERPL-CCNC: 54 U/L — HIGH (ref 10–40)
BILIRUB SERPL-MCNC: 0.7 MG/DL — SIGNIFICANT CHANGE UP (ref 0.2–1.2)
BUN SERPL-MCNC: 16 MG/DL — SIGNIFICANT CHANGE UP (ref 7–23)
CALCIUM SERPL-MCNC: 8.8 MG/DL — SIGNIFICANT CHANGE UP (ref 8.4–10.5)
CHLORIDE SERPL-SCNC: 103 MMOL/L — SIGNIFICANT CHANGE UP (ref 96–108)
CO2 SERPL-SCNC: 18 MMOL/L — LOW (ref 22–31)
CREAT SERPL-MCNC: 0.92 MG/DL — SIGNIFICANT CHANGE UP (ref 0.5–1.3)
CULTURE RESULTS: NO GROWTH — SIGNIFICANT CHANGE UP
CULTURE RESULTS: SIGNIFICANT CHANGE UP
EGFR: 91 ML/MIN/1.73M2 — SIGNIFICANT CHANGE UP
GLUCOSE SERPL-MCNC: 79 MG/DL — SIGNIFICANT CHANGE UP (ref 70–99)
HCT VFR BLD CALC: 45.4 % — SIGNIFICANT CHANGE UP (ref 39–50)
HCT VFR BLD CALC: 47.7 % — SIGNIFICANT CHANGE UP (ref 39–50)
HCV AB S/CO SERPL IA: 0.22 S/CO — SIGNIFICANT CHANGE UP (ref 0–0.99)
HCV AB SERPL-IMP: SIGNIFICANT CHANGE UP
HGB BLD-MCNC: 15.6 G/DL — SIGNIFICANT CHANGE UP (ref 13–17)
HGB BLD-MCNC: 16.7 G/DL — SIGNIFICANT CHANGE UP (ref 13–17)
MCHC RBC-ENTMCNC: 32.1 PG — SIGNIFICANT CHANGE UP (ref 27–34)
MCHC RBC-ENTMCNC: 32.3 PG — SIGNIFICANT CHANGE UP (ref 27–34)
MCHC RBC-ENTMCNC: 34.4 GM/DL — SIGNIFICANT CHANGE UP (ref 32–36)
MCHC RBC-ENTMCNC: 35 GM/DL — SIGNIFICANT CHANGE UP (ref 32–36)
MCV RBC AUTO: 91.7 FL — SIGNIFICANT CHANGE UP (ref 80–100)
MCV RBC AUTO: 94 FL — SIGNIFICANT CHANGE UP (ref 80–100)
NRBC # BLD: 0 /100 WBCS — SIGNIFICANT CHANGE UP (ref 0–0)
NRBC # BLD: 0 /100 WBCS — SIGNIFICANT CHANGE UP (ref 0–0)
PLATELET # BLD AUTO: 203 K/UL — SIGNIFICANT CHANGE UP (ref 150–400)
PLATELET # BLD AUTO: 218 K/UL — SIGNIFICANT CHANGE UP (ref 150–400)
POTASSIUM SERPL-MCNC: 3.3 MMOL/L — LOW (ref 3.5–5.3)
POTASSIUM SERPL-SCNC: 3.3 MMOL/L — LOW (ref 3.5–5.3)
PROT SERPL-MCNC: 6.7 G/DL — SIGNIFICANT CHANGE UP (ref 6–8.3)
RBC # BLD: 4.83 M/UL — SIGNIFICANT CHANGE UP (ref 4.2–5.8)
RBC # BLD: 5.2 M/UL — SIGNIFICANT CHANGE UP (ref 4.2–5.8)
RBC # FLD: 12.6 % — SIGNIFICANT CHANGE UP (ref 10.3–14.5)
RBC # FLD: 12.8 % — SIGNIFICANT CHANGE UP (ref 10.3–14.5)
SODIUM SERPL-SCNC: 135 MMOL/L — SIGNIFICANT CHANGE UP (ref 135–145)
SPECIMEN SOURCE: SIGNIFICANT CHANGE UP
SPECIMEN SOURCE: SIGNIFICANT CHANGE UP
WBC # BLD: 3.93 K/UL — SIGNIFICANT CHANGE UP (ref 3.8–10.5)
WBC # BLD: 4.66 K/UL — SIGNIFICANT CHANGE UP (ref 3.8–10.5)
WBC # FLD AUTO: 3.93 K/UL — SIGNIFICANT CHANGE UP (ref 3.8–10.5)
WBC # FLD AUTO: 4.66 K/UL — SIGNIFICANT CHANGE UP (ref 3.8–10.5)

## 2024-03-29 PROCEDURE — 99222 1ST HOSP IP/OBS MODERATE 55: CPT | Mod: GC

## 2024-03-29 PROCEDURE — 99232 SBSQ HOSP IP/OBS MODERATE 35: CPT | Mod: GC

## 2024-03-29 PROCEDURE — 99233 SBSQ HOSP IP/OBS HIGH 50: CPT

## 2024-03-29 RX ORDER — POTASSIUM CHLORIDE 20 MEQ
40 PACKET (EA) ORAL EVERY 4 HOURS
Refills: 0 | Status: COMPLETED | OUTPATIENT
Start: 2024-03-29 | End: 2024-03-29

## 2024-03-29 RX ORDER — AMLODIPINE BESYLATE 2.5 MG/1
5 TABLET ORAL DAILY
Refills: 0 | Status: DISCONTINUED | OUTPATIENT
Start: 2024-03-29 | End: 2024-03-31

## 2024-03-29 RX ORDER — SIMETHICONE 80 MG/1
80 TABLET, CHEWABLE ORAL ONCE
Refills: 0 | Status: DISCONTINUED | OUTPATIENT
Start: 2024-03-29 | End: 2024-03-31

## 2024-03-29 RX ADMIN — Medication 40 MILLIEQUIVALENT(S): at 13:58

## 2024-03-29 RX ADMIN — Medication 650 MILLIGRAM(S): at 09:42

## 2024-03-29 RX ADMIN — Medication 650 MILLIGRAM(S): at 09:12

## 2024-03-29 RX ADMIN — Medication 40 MILLIEQUIVALENT(S): at 10:03

## 2024-03-29 RX ADMIN — AZITHROMYCIN 255 MILLIGRAM(S): 500 TABLET, FILM COATED ORAL at 17:46

## 2024-03-29 NOTE — PROGRESS NOTE ADULT - ASSESSMENT
68 year old  Romanian speaking male visiting from [facility] with pmh of HTN , HLD , presents for nausea vomiting and diarrhea over the past day. Found to have Infections colitis c/b Salmonella, Campylobacter, EAEC, EPEC, ETEC as well as ?melena

## 2024-03-29 NOTE — PROGRESS NOTE ADULT - SUBJECTIVE AND OBJECTIVE BOX
Follow Up:  fevers, diarrhea    Interval History/ROS: Tmax 101.1 overnight, vitals otherwise stable, WBC 10-->4. Cultures remain negative.    Allergies  No Known Allergies        ANTIMICROBIALS:  azithromycin  IVPB    azithromycin  IVPB 500 every 24 hours      OTHER MEDS:  MEDICATIONS  (STANDING):  acetaminophen     Tablet .. 650 every 6 hours PRN  influenza  Vaccine (HIGH DOSE) 0.7 once  melatonin 3 at bedtime PRN  ondansetron Injectable 4 every 8 hours PRN      Vital Signs Last 24 Hrs  T(C): 37.3 (29 Mar 2024 04:22), Max: 38.4 (28 Mar 2024 20:08)  T(F): 99.2 (29 Mar 2024 04:22), Max: 101.1 (28 Mar 2024 20:08)  HR: 81 (29 Mar 2024 04:22) (80 - 96)  BP: 118/75 (29 Mar 2024 04:22) (108/65 - 145/78)  BP(mean): --  RR: 18 (29 Mar 2024 04:22) (17 - 18)  SpO2: 97% (29 Mar 2024 04:22) (93% - 100%)    Parameters below as of 29 Mar 2024 04:22  Patient On (Oxygen Delivery Method): room air        PHYSICAL EXAM:                                  15.6   4.66  )-----------( 203      ( 29 Mar 2024 07:37 )             45.4       03-29    135  |  103  |  16  ----------------------------<  79  3.3<L>   |  18<L>  |  0.92    Ca    8.8      29 Mar 2024 07:37    TPro  6.7  /  Alb  3.8  /  TBili  0.7  /  DBili  x   /  AST  54<H>  /  ALT  77<H>  /  AlkPhos  69  03-29      Urinalysis Basic - ( 29 Mar 2024 07:37 )    Color: x / Appearance: x / SG: x / pH: x  Gluc: 79 mg/dL / Ketone: x  / Bili: x / Urobili: x   Blood: x / Protein: x / Nitrite: x   Leuk Esterase: x / RBC: x / WBC x   Sq Epi: x / Non Sq Epi: x / Bacteria: x        MICROBIOLOGY:  v    Culture - Reflex Stool (collected 28 Mar 2024 04:41)  Source: .Stool  Preliminary Report (28 Mar 2024 17:26):    Culture in progress    Culture - Urine (collected 28 Mar 2024 02:34)  Source: Clean Catch Clean Catch (Midstream)  Final Report (29 Mar 2024 07:23):    No growth    Culture - Blood (collected 28 Mar 2024 00:50)  Source: .Blood Blood-Peripheral  Preliminary Report (29 Mar 2024 04:02):    No growth at 24 hours    Culture - Blood (collected 28 Mar 2024 00:35)  Source: .Blood Blood-Peripheral  Preliminary Report (29 Mar 2024 04:02):    No growth at 24 hours                    RADIOLOGY:   Follow Up:  fevers, diarrhea    Interval History/ROS: Tmax 101.1 overnight, vitals otherwise stable, WBC 10-->4. Tolerating regular diet, overall feels better, but continues to have loose stools, Cultures remain negative.    Allergies  No Known Allergies        ANTIMICROBIALS:  azithromycin  IVPB    azithromycin  IVPB 500 every 24 hours      OTHER MEDS:  MEDICATIONS  (STANDING):  acetaminophen     Tablet .. 650 every 6 hours PRN  influenza  Vaccine (HIGH DOSE) 0.7 once  melatonin 3 at bedtime PRN  ondansetron Injectable 4 every 8 hours PRN      Vital Signs Last 24 Hrs  T(C): 37.3 (29 Mar 2024 04:22), Max: 38.4 (28 Mar 2024 20:08)  T(F): 99.2 (29 Mar 2024 04:22), Max: 101.1 (28 Mar 2024 20:08)  HR: 81 (29 Mar 2024 04:22) (80 - 96)  BP: 118/75 (29 Mar 2024 04:22) (108/65 - 145/78)  BP(mean): --  RR: 18 (29 Mar 2024 04:22) (17 - 18)  SpO2: 97% (29 Mar 2024 04:22) (93% - 100%)    Parameters below as of 29 Mar 2024 04:22  Patient On (Oxygen Delivery Method): room air        PHYSICAL EXAM:    General: Patient in NAD  HEENT: NCAT, EOMI, PERRL, no oral lesions  CV: S1+S2, no m/r/g appreciated   Lungs: No respiratory distress, CTAB  Abd: Soft, nontender, no guarding, no rebound tenderness, + bowel sounds   : No suprapubic tenderness  Neuro: Alert and oriented to time, place and person. Moves all extremities against gravity.  Ext: No cyanosis, no edema  Skin: No rash, no phlebitis                                15.6   4.66  )-----------( 203      ( 29 Mar 2024 07:37 )             45.4       03-29    135  |  103  |  16  ----------------------------<  79  3.3<L>   |  18<L>  |  0.92    Ca    8.8      29 Mar 2024 07:37    TPro  6.7  /  Alb  3.8  /  TBili  0.7  /  DBili  x   /  AST  54<H>  /  ALT  77<H>  /  AlkPhos  69  03-29      Urinalysis Basic - ( 29 Mar 2024 07:37 )    Color: x / Appearance: x / SG: x / pH: x  Gluc: 79 mg/dL / Ketone: x  / Bili: x / Urobili: x   Blood: x / Protein: x / Nitrite: x   Leuk Esterase: x / RBC: x / WBC x   Sq Epi: x / Non Sq Epi: x / Bacteria: x        MICROBIOLOGY:  v    Culture - Reflex Stool (collected 28 Mar 2024 04:41)  Source: .Stool  Preliminary Report (28 Mar 2024 17:26):    Culture in progress    Culture - Urine (collected 28 Mar 2024 02:34)  Source: Clean Catch Clean Catch (Midstream)  Final Report (29 Mar 2024 07:23):    No growth    Culture - Blood (collected 28 Mar 2024 00:50)  Source: .Blood Blood-Peripheral  Preliminary Report (29 Mar 2024 04:02):    No growth at 24 hours    Culture - Blood (collected 28 Mar 2024 00:35)  Source: .Blood Blood-Peripheral  Preliminary Report (29 Mar 2024 04:02):    No growth at 24 hours                    RADIOLOGY:   Follow Up:  fevers, diarrhea    Interval History/ROS: Tmax 101.1 overnight, vitals otherwise stable, WBC 10-->4. Tolerating regular diet, overall feels better, but continues to have loose stools, Cultures remain negative.  no cough or SOB  Allergies  No Known Allergies        ANTIMICROBIALS:  azithromycin  IVPB    azithromycin  IVPB 500 every 24 hours      OTHER MEDS:  MEDICATIONS  (STANDING):  acetaminophen     Tablet .. 650 every 6 hours PRN  influenza  Vaccine (HIGH DOSE) 0.7 once  melatonin 3 at bedtime PRN  ondansetron Injectable 4 every 8 hours PRN      Vital Signs Last 24 Hrs  T(C): 37.3 (29 Mar 2024 04:22), Max: 38.4 (28 Mar 2024 20:08)  T(F): 99.2 (29 Mar 2024 04:22), Max: 101.1 (28 Mar 2024 20:08)  HR: 81 (29 Mar 2024 04:22) (80 - 96)  BP: 118/75 (29 Mar 2024 04:22) (108/65 - 145/78)  BP(mean): --  RR: 18 (29 Mar 2024 04:22) (17 - 18)  SpO2: 97% (29 Mar 2024 04:22) (93% - 100%)    Parameters below as of 29 Mar 2024 04:22  Patient On (Oxygen Delivery Method): room air        PHYSICAL EXAM:    General: Patient in NAD  CV: S1+S2, no m/r/g appreciated   Lungs: No respiratory distress, CTAB  Abd: Soft, nontender, no guarding, no rebound tenderness, + bowel sounds   : No suprapubic tenderness  Ext: No cyanosis, no edema  Skin: No rash, no phlebitis                                15.6   4.66  )-----------( 203      ( 29 Mar 2024 07:37 )             45.4       03-29    135  |  103  |  16  ----------------------------<  79  3.3<L>   |  18<L>  |  0.92    Ca    8.8      29 Mar 2024 07:37    TPro  6.7  /  Alb  3.8  /  TBili  0.7  /  DBili  x   /  AST  54<H>  /  ALT  77<H>  /  AlkPhos  69  03-29      Urinalysis Basic - ( 29 Mar 2024 07:37 )    Color: x / Appearance: x / SG: x / pH: x  Gluc: 79 mg/dL / Ketone: x  / Bili: x / Urobili: x   Blood: x / Protein: x / Nitrite: x   Leuk Esterase: x / RBC: x / WBC x   Sq Epi: x / Non Sq Epi: x / Bacteria: x        MICROBIOLOGY:  v    Culture - Reflex Stool (collected 28 Mar 2024 04:41)  Source: .Stool  Preliminary Report (28 Mar 2024 17:26):    Culture in progress    Culture - Urine (collected 28 Mar 2024 02:34)  Source: Clean Catch Clean Catch (Midstream)  Final Report (29 Mar 2024 07:23):    No growth    Culture - Blood (collected 28 Mar 2024 00:50)  Source: .Blood Blood-Peripheral  Preliminary Report (29 Mar 2024 04:02):    No growth at 24 hours    Culture - Blood (collected 28 Mar 2024 00:35)  Source: .Blood Blood-Peripheral  Preliminary Report (29 Mar 2024 04:02):    No growth at 24 hours                    RADIOLOGY:  images reviewed personally    < from: CT Abdomen and Pelvis w/ IV Cont (03.28.24 @ 01:51) >  IMPRESSION:  Wall thickening of the cecum, transverse colon, and descending colon with   fluid throughout the colon suggestive of colitis, possibly   infectious/inflammatory in etiology.    Apparent wall thickening of the stomach which may be related to   underdistention versus gastritis.    Hepatic steatosis.    < end of copied text >

## 2024-03-29 NOTE — PROGRESS NOTE ADULT - ASSESSMENT
Patient is a 68 year old  Citizen of [place] speaking male visiting from [facility] with pmh of HTN , HLD , presents for nausea vomiting and diarrhea over the past day.   Per son,   over the past day patient has been having intermittent green non bloody  foul smelling watery diarrhea  associated with poor appetite , decreased oral intake, abdominal cramping and bloating relieved with bowel movement , he also has a few episodes of non bloody emesis during this time. Patient also reports fever and chills as well as lightheadedness. He reports no sick contacts. He recently arrived from [facility] on 3/24.  He had a colonoscopy about 4 months ago reportedly without significant abnormalities.     ED Tmax 101.9 WBC 10.8    UA 11 WBC  Limited RVP neg    GI PCR+ campylobacter, salmonella, EAEC, EPEC, ETEC     Stool cx pending  Stool Ova/cyst pending    Bcx Pending    CT A/P    Wall thickening of the cecum, transverse colon, and descending colon with   fluid throughout the colon suggestive of colitis, possibly   infectious/inflammatory in etiology.      # Fevers  # Infectious colitis; severe symptoms  # GI PCR with polymicrobial positivity    WBC trending down, remains febrile  Complete Azithromycin 500 mg PO/IV X 3 days  follow up blood stool cultures, stool ova& cysts      All recommendations are tentative pending Attending Attestation.    Elder Ramirez MD, PGY-4  ID Fellow  [employer] Teams Preferred  After 5pm/weekends call [phone]       Patient is a 68 year old  Citizen of [place] speaking male visiting from [facility] with pmh of HTN , HLD , presents for nausea vomiting and diarrhea over the past day.   Per son,   over the past day patient has been having intermittent green non bloody  foul smelling watery diarrhea  associated with poor appetite , decreased oral intake, abdominal cramping and bloating relieved with bowel movement , he also has a few episodes of non bloody emesis during this time. Patient also reports fever and chills as well as lightheadedness. He reports no sick contacts. He recently arrived from [facility] on 3/24.  He had a colonoscopy about 4 months ago reportedly without significant abnormalities.     ED Tmax 101.9 WBC 10.8    UA 11 WBC  Limited RVP neg    GI PCR+ campylobacter, salmonella, EAEC, EPEC, ETEC     Stool cx pending  Stool Ova/cyst pending    Bcx Pending    CT A/P    Wall thickening of the cecum, transverse colon, and descending colon with   fluid throughout the colon suggestive of colitis, possibly   infectious/inflammatory in etiology.      # Fevers  # Infectious colitis; severe symptoms  # GI PCR with polymicrobial positivity    WBC trending down, overall improved, tolerating diet, diarrhea persists  Complete Azithromycin 500 mg PO/IV X 3 days  follow up blood stool cultures, stool ova& cysts      All recommendations are tentative pending Attending Attestation.    Elder Ramirez MD, PGY-4  ID Fellow  [employer] Teams Preferred  After 5pm/weekends call [phone]

## 2024-03-29 NOTE — PROGRESS NOTE ADULT - ATTENDING COMMENTS
68 m with HTN , HLD, came from [facility] 3/24 and now with abd pain, vomiting, diarrhea, fever, here febrile to 101.9, tachy, WBC: 10  CT: Wall thickening of the cecum, transverse colon, and descending colon with fluid throughout the colon suggestive of colitis, possibly infectious/inflammatory in etiology. Apparent wall thickening of the stomach which may be related to underdistention versus gastritis. Hepatic steatosis.  GI PCR showed campylobacter, salmonella, EAEC, EPEC, ETEC      fever, colitis after a trip to [facility], GI PCR with salmonella, campylobacter, EAEC, EPEC and ETEC  blood cx negative  * follow the stool cx  * c/w azithro 500 qd, started 3/28 now day 2, will likely do a 3 day course but pt is still febrile so will monitor  * monitor CBC/diff and CMP    The above assessment and plan was discussed with the primary team    Karlene Duong MD  contact on teams  After 5pm and on weekends call [phone]

## 2024-03-29 NOTE — PROGRESS NOTE ADULT - PROBLEM SELECTOR PLAN 2
- Reports ?melena this morning   - No urgent role for endoscopic intervention   - Monitor H/H  - GI follow up

## 2024-03-29 NOTE — CONSULT NOTE ADULT - SUBJECTIVE AND OBJECTIVE BOX
Chief Complaint:  Patient is a 68y old  Male who presents with a chief complaint of nausea vomiting and diarrhea x 1 day (29 Mar 2024 08:50)      HPI:LAURIE ROMERO is a 68y Male    PMHX/PSHX:  No pertinent past medical history    HLD (hyperlipidemia)    HTN (hypertension)    No significant past surgical history      Allergies:  No Known Allergies      Home Medications: reviewed  Hospital Medications:  acetaminophen     Tablet .. 650 milliGRAM(s) Oral every 6 hours PRN  azithromycin  IVPB      azithromycin  IVPB 500 milliGRAM(s) IV Intermittent every 24 hours  influenza  Vaccine (HIGH DOSE) 0.7 milliLiter(s) IntraMuscular once  melatonin 3 milliGRAM(s) Oral at bedtime PRN  ondansetron Injectable 4 milliGRAM(s) IV Push every 8 hours PRN  potassium chloride    Tablet ER 40 milliEquivalent(s) Oral every 4 hours      Social History:   Tob: Denies  EtOH: Denies  Illicit Drugs: Denies    Family history:  FH: colon cancer (Mother)    FH: prostate cancer (Father)           ROS:   Complete and normal except as mentioned above.    PHYSICAL EXAM:   Vital Signs:  Vital Signs Last 24 Hrs  T(C): 36.5 (29 Mar 2024 12:21), Max: 38.4 (28 Mar 2024 20:08)  T(F): 97.7 (29 Mar 2024 12:21), Max: 101.1 (28 Mar 2024 20:08)  HR: 79 (29 Mar 2024 12:21) (79 - 96)  BP: 111/73 (29 Mar 2024 12:21) (108/65 - 145/78)  BP(mean): --  RR: 18 (29 Mar 2024 12:21) (17 - 18)  SpO2: 97% (29 Mar 2024 12:21) (93% - 100%)    Parameters below as of 29 Mar 2024 12:21  Patient On (Oxygen Delivery Method): room air      Daily     Daily     GENERAL: no acute distress  NEURO: aox3  HEENT: anicteric sclera, no conjunctival pallor appreciated  CHEST: no respiratory distress, no accessory muscle use  CARDIAC: regular rate   ABDOMEN: soft, non-tender, non-distended, no rebound or guarding  EXTREMITIES: warm, well perfused   SKIN: no lesions noted    LABS:                          15.6   4.66  )-----------( 203      ( 29 Mar 2024 07:37 )             45.4     03-29    135  |  103  |  16  ----------------------------<  79  3.3<L>   |  18<L>  |  0.92    Ca    8.8      29 Mar 2024 07:37    TPro  6.7  /  Alb  3.8  /  TBili  0.7  /  DBili  x   /  AST  54<H>  /  ALT  77<H>  /  AlkPhos  69  03-29    LIVER FUNCTIONS - ( 29 Mar 2024 07:37 )  Alb: 3.8 g/dL / Pro: 6.7 g/dL / ALK PHOS: 69 U/L / ALT: 77 U/L / AST: 54 U/L / GGT: x             Culture - Reflex Stool (collected 28 Mar 2024 04:41)  Source: .Stool  Preliminary Report (28 Mar 2024 17:26):    Culture in progress    Culture - Urine (collected 28 Mar 2024 02:34)  Source: Clean Catch Clean Catch (Midstream)  Final Report (29 Mar 2024 07:23):    No growth    Culture - Blood (collected 28 Mar 2024 00:50)  Source: .Blood Blood-Peripheral  Preliminary Report (29 Mar 2024 04:02):    No growth at 24 hours    Culture - Blood (collected 28 Mar 2024 00:35)  Source: .Blood Blood-Peripheral  Preliminary Report (29 Mar 2024 04:02):    No growth at 24 hours        Diagnostic Studies:   < from: CT Abdomen and Pelvis w/ IV Cont (03.28.24 @ 01:51) >  :  CT ABDOMEN AND PELVIS IC   ORDERED BY: CHEYANNE HEATH     PROCEDURE DATE:  03/28/2024          INTERPRETATION:  CLINICAL INFORMATION: Abdominal pain, vomiting, and   diarrhea.    COMPARISON: None.    CONTRAST/COMPLICATIONS:  IV Contrast: Omnipaque 350  90 cc administered   10 cc discarded  Oral Contrast: NONE  Complications: None reported at time of study completion    PROCEDURE:  CT of the Abdomen and Pelvis was performed.  Sagittal and coronal reformats were performed.    FINDINGS:  LOWER CHEST: Bibasilar atelectasis. 5 mm right middle lobe pulmonary   nodule..    LIVER: Steatosis.  BILE DUCTS: Normal caliber.  GALLBLADDER: Within normal limits.  SPLEEN: Within normal limits.  PANCREAS: Within normal limits.  ADRENALS:Within normal limits.  KIDNEYS/URETERS: Within normal limits.    BLADDER: Minimally distended. Small right posterolateral diverticulum.   Diffuse wall thickening.  REPRODUCTIVE ORGANS: Prostate enlarged, with post TURP changes.    BOWEL: No bowel obstruction. Wall thickening of the cecum, transverse   colon, and descending colon with fluid throughout the colon suggestive of   colitis. Apparent wall thickening of the stomach which may be related to   underdistention versus gastritis. Appendix is normal.  PERITONEUM: No ascites.  VESSELS: Minimal atherosclerotic changes..  RETROPERITONEUM/LYMPH NODES: No lymphadenopathy.  ABDOMINAL WALL: Small fat-containing umbilical hernia.  BONES: Degenerative changes.    IMPRESSION:  Wall thickening of the cecum, transverse colon, and descending colon with   fluid throughout the colon suggestive of colitis, possibly   infectious/inflammatory in etiology.    Apparent wall thickening of the stomach which may be related to   underdistention versus gastritis.    Hepatic steatosis.        --- End of Report ---    < end of copied text >         Chief Complaint:  Patient is a 68y old  Male who presents with a chief complaint of nausea vomiting and diarrhea x 1 day (29 Mar 2024 08:50)      HPI:LAURIE ROMERO is a 68y Male with HTN, HLD who initially presented to the ED with nausea, NBNB vomiting, non-bloody diarrhea after arriving from [facility] (3/24), found to have GI PCR positive for Campylobacter, Salmonella, ETEC, EPEC, EAEC. GI consulted for 1 episode of dark colored stool this morning.     Patient and wife interviewed at bedside with Swedish phone . Patient denying n/v, continues to have diarrhea. Denies black stool, but says often dark with green. Reporting colonoscopy about 4-6 mos PTA and believes it was normal. Nausea/vomiting resolved and denying abdominal pain, tolerating regular diet.    PMHX/PSHX:       HLD (hyperlipidemia)    HTN (hypertension)     Allergies:  No Known Allergies      Home Medications: reviewed  Hospital Medications:  acetaminophen     Tablet .. 650 milliGRAM(s) Oral every 6 hours PRN  azithromycin  IVPB      azithromycin  IVPB 500 milliGRAM(s) IV Intermittent every 24 hours  influenza  Vaccine (HIGH DOSE) 0.7 milliLiter(s) IntraMuscular once  melatonin 3 milliGRAM(s) Oral at bedtime PRN  ondansetron Injectable 4 milliGRAM(s) IV Push every 8 hours PRN  potassium chloride    Tablet ER 40 milliEquivalent(s) Oral every 4 hours      Social History:   None reported.    Family history:  FH: colon cancer (Mother)    FH: prostate cancer (Father)           ROS:   Complete and normal except as mentioned above.    PHYSICAL EXAM:   Vital Signs:  Vital Signs Last 24 Hrs  T(C): 36.5 (29 Mar 2024 12:21), Max: 38.4 (28 Mar 2024 20:08)  T(F): 97.7 (29 Mar 2024 12:21), Max: 101.1 (28 Mar 2024 20:08)  HR: 79 (29 Mar 2024 12:21) (79 - 96)  BP: 111/73 (29 Mar 2024 12:21) (108/65 - 145/78)  BP(mean): --  RR: 18 (29 Mar 2024 12:21) (17 - 18)  SpO2: 97% (29 Mar 2024 12:21) (93% - 100%)    Parameters below as of 29 Mar 2024 12:21  Patient On (Oxygen Delivery Method): room air      Daily     Daily     GENERAL: no acute distress  NEURO: aox3  HEENT: anicteric sclera, no conjunctival pallor appreciated  CHEST: no respiratory distress, no accessory muscle use  CARDIAC: regular rate   ABDOMEN: soft, non-tender, non-distended, no rebound or guarding  EXTREMITIES: warm, well perfused   SKIN: no lesions noted    LABS:                          15.6   4.66  )-----------( 203      ( 29 Mar 2024 07:37 )             45.4     03-29    135  |  103  |  16  ----------------------------<  79  3.3<L>   |  18<L>  |  0.92    Ca    8.8      29 Mar 2024 07:37    TPro  6.7  /  Alb  3.8  /  TBili  0.7  /  DBili  x   /  AST  54<H>  /  ALT  77<H>  /  AlkPhos  69  03-29    LIVER FUNCTIONS - ( 29 Mar 2024 07:37 )  Alb: 3.8 g/dL / Pro: 6.7 g/dL / ALK PHOS: 69 U/L / ALT: 77 U/L / AST: 54 U/L / GGT: x             Culture - Reflex Stool (collected 28 Mar 2024 04:41)  Source: .Stool  Preliminary Report (28 Mar 2024 17:26):    Culture in progress    Culture - Urine (collected 28 Mar 2024 02:34)  Source: Clean Catch Clean Catch (Midstream)  Final Report (29 Mar 2024 07:23):    No growth    Culture - Blood (collected 28 Mar 2024 00:50)  Source: .Blood Blood-Peripheral  Preliminary Report (29 Mar 2024 04:02):    No growth at 24 hours    Culture - Blood (collected 28 Mar 2024 00:35)  Source: .Blood Blood-Peripheral  Preliminary Report (29 Mar 2024 04:02):    No growth at 24 hours        Diagnostic Studies:   < from: CT Abdomen and Pelvis w/ IV Cont (03.28.24 @ 01:51) >  :  CT ABDOMEN AND PELVIS IC   ORDERED BY: CHEYANNE HEATH     PROCEDURE DATE:  03/28/2024          INTERPRETATION:  CLINICAL INFORMATION: Abdominal pain, vomiting, and   diarrhea.    COMPARISON: None.    CONTRAST/COMPLICATIONS:  IV Contrast: Omnipaque 350  90 cc administered   10 cc discarded  Oral Contrast: NONE  Complications: None reported at time of study completion    PROCEDURE:  CT of the Abdomen and Pelvis was performed.  Sagittal and coronal reformats were performed.    FINDINGS:  LOWER CHEST: Bibasilar atelectasis. 5 mm right middle lobe pulmonary   nodule..    LIVER: Steatosis.  BILE DUCTS: Normal caliber.  GALLBLADDER: Within normal limits.  SPLEEN: Within normal limits.  PANCREAS: Within normal limits.  ADRENALS:Within normal limits.  KIDNEYS/URETERS: Within normal limits.    BLADDER: Minimally distended. Small right posterolateral diverticulum.   Diffuse wall thickening.  REPRODUCTIVE ORGANS: Prostate enlarged, with post TURP changes.    BOWEL: No bowel obstruction. Wall thickening of the cecum, transverse   colon, and descending colon with fluid throughout the colon suggestive of   colitis. Apparent wall thickening of the stomach which may be related to   underdistention versus gastritis. Appendix is normal.  PERITONEUM: No ascites.  VESSELS: Minimal atherosclerotic changes..  RETROPERITONEUM/LYMPH NODES: No lymphadenopathy.  ABDOMINAL WALL: Small fat-containing umbilical hernia.  BONES: Degenerative changes.    IMPRESSION:  Wall thickening of the cecum, transverse colon, and descending colon with   fluid throughout the colon suggestive of colitis, possibly   infectious/inflammatory in etiology.    Apparent wall thickening of the stomach which may be related to   underdistention versus gastritis.    Hepatic steatosis.        --- End of Report ---    < end of copied text >

## 2024-03-29 NOTE — PROGRESS NOTE ADULT - SUBJECTIVE AND OBJECTIVE BOX
Patient is a 68y old  Male who presents with a chief complaint of nausea vomiting and diarrhea x 1 day (29 Mar 2024 12:47)      SUBJECTIVE / OVERNIGHT EVENTS: translating in Australian by self, pt feels better, still with abdominal pain but improved, no cp, sob, reports he had one episode of melena this morning     MEDICATIONS  (STANDING):  azithromycin  IVPB      azithromycin  IVPB 500 milliGRAM(s) IV Intermittent every 24 hours  influenza  Vaccine (HIGH DOSE) 0.7 milliLiter(s) IntraMuscular once  potassium chloride    Tablet ER 40 milliEquivalent(s) Oral every 4 hours    MEDICATIONS  (PRN):  acetaminophen     Tablet .. 650 milliGRAM(s) Oral every 6 hours PRN Temp greater or equal to 38C (100.4F), Mild Pain (1 - 3)  melatonin 3 milliGRAM(s) Oral at bedtime PRN Insomnia  ondansetron Injectable 4 milliGRAM(s) IV Push every 8 hours PRN Nausea and/or Vomiting        CAPILLARY BLOOD GLUCOSE        I&O's Summary    28 Mar 2024 07:01  -  29 Mar 2024 07:00  --------------------------------------------------------  IN: 250 mL / OUT: 0 mL / NET: 250 mL    29 Mar 2024 07:01  -  29 Mar 2024 13:27  --------------------------------------------------------  IN: 240 mL / OUT: 0 mL / NET: 240 mL        PHYSICAL EXAM:  GENERAL: NAD, well-developed  HEAD:  Atraumatic, Normocephalic  EYES: conjunctiva and sclera clear  NECK: No JVD  CHEST/LUNG: Clear to auscultation bilaterally; No wheeze  HEART: Regular rate and rhythm; S1S2  ABDOMEN: Soft, midepigastric ttp , Nondistended; Bowel sounds present  EXTREMITIES:  2+ Peripheral Pulses, No clubbing, cyanosis, or edema  PSYCH: AAOx3      LABS:                        15.6   4.66  )-----------( 203      ( 29 Mar 2024 07:37 )             45.4     03-29    135  |  103  |  16  ----------------------------<  79  3.3<L>   |  18<L>  |  0.92    Ca    8.8      29 Mar 2024 07:37    TPro  6.7  /  Alb  3.8  /  TBili  0.7  /  DBili  x   /  AST  54<H>  /  ALT  77<H>  /  AlkPhos  69  03-29    PT/INR - ( 28 Mar 2024 00:53 )   PT: 12.5 sec;   INR: 1.14 ratio         PTT - ( 28 Mar 2024 00:53 )  PTT:29.9 sec      Urinalysis Basic - ( 29 Mar 2024 07:37 )    Color: x / Appearance: x / SG: x / pH: x  Gluc: 79 mg/dL / Ketone: x  / Bili: x / Urobili: x   Blood: x / Protein: x / Nitrite: x   Leuk Esterase: x / RBC: x / WBC x   Sq Epi: x / Non Sq Epi: x / Bacteria: x        RADIOLOGY & ADDITIONAL TESTS:    Imaging Personally Reviewed:    Consultant(s) Notes Reviewed:      Care Discussed with Consultants/Other Providers:

## 2024-03-29 NOTE — SBIRT NOTE ADULT - NSSBIRTALCNOACTINTDET_GEN_A_CORE
YANG consulted via [facility]rise regarding SBIRT screening. Patient within healthy drinking guidelines. No SBIRT interventions/ resources needed at this time. SW remains available as needed.

## 2024-03-29 NOTE — CONSULT NOTE ADULT - ATTENDING COMMENTS
Dark stool  Likely blood from his infectious colitis  NO plans for EGD  continue po  follow up clinically
68 m with HTN , HLD, came from [facility] 3/24 and now with abd pain, vomiting, diarrhea, fever, here febrile to 101.9, tachy, WBC: 10  CT: Wall thickening of the cecum, transverse colon, and descending colon with fluid throughout the colon suggestive of colitis, possibly infectious/inflammatory in etiology. Apparent wall thickening of the stomach which may be related to underdistention versus gastritis. Hepatic steatosis.  GI PCR showed campylobacter, salmonella, EAEC, EPEC, ETEC      fever, colitis after a trip to [facility], GI PCR with salmonella, campylobacter, EAEC, EPEC and ETEC    * follow the  blood cx and stool cx  * start azithro 500 qd  * discontinue zosyn  * monitor CBC/diff and CMP    The above assessment and plan was discussed with the primary team    Karlene Duong MD  contact on teams  After 5pm and on weekends call [phone]

## 2024-03-30 LAB
ALBUMIN SERPL ELPH-MCNC: 4 G/DL — SIGNIFICANT CHANGE UP (ref 3.3–5)
ALP SERPL-CCNC: 68 U/L — SIGNIFICANT CHANGE UP (ref 40–120)
ALT FLD-CCNC: 72 U/L — HIGH (ref 10–45)
ANION GAP SERPL CALC-SCNC: 20 MMOL/L — HIGH (ref 5–17)
AST SERPL-CCNC: 50 U/L — HIGH (ref 10–40)
BASOPHILS # BLD AUTO: 0.02 K/UL — SIGNIFICANT CHANGE UP (ref 0–0.2)
BASOPHILS NFR BLD AUTO: 0.5 % — SIGNIFICANT CHANGE UP (ref 0–2)
BILIRUB DIRECT SERPL-MCNC: <0.1 MG/DL — SIGNIFICANT CHANGE UP (ref 0–0.3)
BILIRUB INDIRECT FLD-MCNC: >0.4 MG/DL — SIGNIFICANT CHANGE UP (ref 0.2–1)
BILIRUB SERPL-MCNC: 0.5 MG/DL — SIGNIFICANT CHANGE UP (ref 0.2–1.2)
BUN SERPL-MCNC: 12 MG/DL — SIGNIFICANT CHANGE UP (ref 7–23)
CALCIUM SERPL-MCNC: 9.1 MG/DL — SIGNIFICANT CHANGE UP (ref 8.4–10.5)
CHLORIDE SERPL-SCNC: 104 MMOL/L — SIGNIFICANT CHANGE UP (ref 96–108)
CO2 SERPL-SCNC: 15 MMOL/L — LOW (ref 22–31)
CREAT SERPL-MCNC: 0.85 MG/DL — SIGNIFICANT CHANGE UP (ref 0.5–1.3)
CULTURE RESULTS: ABNORMAL
CULTURE RESULTS: SIGNIFICANT CHANGE UP
EGFR: 95 ML/MIN/1.73M2 — SIGNIFICANT CHANGE UP
EOSINOPHIL # BLD AUTO: 0.1 K/UL — SIGNIFICANT CHANGE UP (ref 0–0.5)
EOSINOPHIL NFR BLD AUTO: 2.4 % — SIGNIFICANT CHANGE UP (ref 0–6)
GLUCOSE SERPL-MCNC: 119 MG/DL — HIGH (ref 70–99)
HCT VFR BLD CALC: 43.4 % — SIGNIFICANT CHANGE UP (ref 39–50)
HGB BLD-MCNC: 15.2 G/DL — SIGNIFICANT CHANGE UP (ref 13–17)
IMM GRANULOCYTES NFR BLD AUTO: 1.7 % — HIGH (ref 0–0.9)
LACTATE SERPL-SCNC: 4.4 MMOL/L — CRITICAL HIGH (ref 0.5–2)
LYMPHOCYTES # BLD AUTO: 1.47 K/UL — SIGNIFICANT CHANGE UP (ref 1–3.3)
LYMPHOCYTES # BLD AUTO: 35.3 % — SIGNIFICANT CHANGE UP (ref 13–44)
MAGNESIUM SERPL-MCNC: 2.1 MG/DL — SIGNIFICANT CHANGE UP (ref 1.6–2.6)
MCHC RBC-ENTMCNC: 31.9 PG — SIGNIFICANT CHANGE UP (ref 27–34)
MCHC RBC-ENTMCNC: 35 GM/DL — SIGNIFICANT CHANGE UP (ref 32–36)
MCV RBC AUTO: 91 FL — SIGNIFICANT CHANGE UP (ref 80–100)
MONOCYTES # BLD AUTO: 0.68 K/UL — SIGNIFICANT CHANGE UP (ref 0–0.9)
MONOCYTES NFR BLD AUTO: 16.3 % — HIGH (ref 2–14)
NEUTROPHILS # BLD AUTO: 1.83 K/UL — SIGNIFICANT CHANGE UP (ref 1.8–7.4)
NEUTROPHILS NFR BLD AUTO: 43.8 % — SIGNIFICANT CHANGE UP (ref 43–77)
NRBC # BLD: 0 /100 WBCS — SIGNIFICANT CHANGE UP (ref 0–0)
PHOSPHATE SERPL-MCNC: 2.6 MG/DL — SIGNIFICANT CHANGE UP (ref 2.5–4.5)
PLATELET # BLD AUTO: 240 K/UL — SIGNIFICANT CHANGE UP (ref 150–400)
POTASSIUM SERPL-MCNC: 3.4 MMOL/L — LOW (ref 3.5–5.3)
POTASSIUM SERPL-SCNC: 3.4 MMOL/L — LOW (ref 3.5–5.3)
PROT SERPL-MCNC: 6.8 G/DL — SIGNIFICANT CHANGE UP (ref 6–8.3)
RBC # BLD: 4.77 M/UL — SIGNIFICANT CHANGE UP (ref 4.2–5.8)
RBC # FLD: 12.5 % — SIGNIFICANT CHANGE UP (ref 10.3–14.5)
SODIUM SERPL-SCNC: 139 MMOL/L — SIGNIFICANT CHANGE UP (ref 135–145)
SPECIMEN SOURCE: SIGNIFICANT CHANGE UP
SPECIMEN SOURCE: SIGNIFICANT CHANGE UP
WBC # BLD: 4.17 K/UL — SIGNIFICANT CHANGE UP (ref 3.8–10.5)
WBC # FLD AUTO: 4.17 K/UL — SIGNIFICANT CHANGE UP (ref 3.8–10.5)

## 2024-03-30 PROCEDURE — 99232 SBSQ HOSP IP/OBS MODERATE 35: CPT

## 2024-03-30 RX ORDER — POTASSIUM CHLORIDE 20 MEQ
40 PACKET (EA) ORAL EVERY 4 HOURS
Refills: 0 | Status: COMPLETED | OUTPATIENT
Start: 2024-03-30 | End: 2024-03-30

## 2024-03-30 RX ORDER — SODIUM CHLORIDE 9 MG/ML
1000 INJECTION INTRAMUSCULAR; INTRAVENOUS; SUBCUTANEOUS
Refills: 0 | Status: DISCONTINUED | OUTPATIENT
Start: 2024-03-30 | End: 2024-03-31

## 2024-03-30 RX ADMIN — Medication 650 MILLIGRAM(S): at 05:41

## 2024-03-30 RX ADMIN — AMLODIPINE BESYLATE 5 MILLIGRAM(S): 2.5 TABLET ORAL at 05:06

## 2024-03-30 RX ADMIN — Medication 650 MILLIGRAM(S): at 16:30

## 2024-03-30 RX ADMIN — SODIUM CHLORIDE 100 MILLILITER(S): 9 INJECTION INTRAMUSCULAR; INTRAVENOUS; SUBCUTANEOUS at 10:14

## 2024-03-30 RX ADMIN — Medication 650 MILLIGRAM(S): at 16:03

## 2024-03-30 RX ADMIN — AZITHROMYCIN 255 MILLIGRAM(S): 500 TABLET, FILM COATED ORAL at 15:57

## 2024-03-30 RX ADMIN — Medication 40 MILLIEQUIVALENT(S): at 10:13

## 2024-03-30 RX ADMIN — Medication 650 MILLIGRAM(S): at 05:11

## 2024-03-30 RX ADMIN — Medication 40 MILLIEQUIVALENT(S): at 15:56

## 2024-03-30 NOTE — PROGRESS NOTE ADULT - ASSESSMENT
68 year old  Scottish speaking male visiting from [facility] with pmh of HTN , HLD , presents for nausea vomiting and diarrhea over the past day. Found to have Infections colitis c/b Salmonella, Campylobacter, EAEC, EPEC, ETEC as well as ?melena

## 2024-03-30 NOTE — PROGRESS NOTE ADULT - REASON FOR ADMISSION
nausea vomiting and diarrhea x 1 day

## 2024-03-30 NOTE — PROGRESS NOTE ADULT - PROBLEM SELECTOR PLAN 2
- Reports ?melena again this morning   - Per GI .No urgent role for endoscopic intervention   - Monitor H/H; if Hgb drop, will reach out to GI again

## 2024-03-30 NOTE — PROGRESS NOTE ADULT - PROBLEM SELECTOR PLAN 5
- Increased cr on admission unclear baseline   - Resolved post IVF  - Continue to monitor cr  - Avoid nephrotoxic agents
- Increased cr on admission unclear baseline   - Resolved post IVF  - Continue to monitor cr  - Avoid nephrotoxic agents

## 2024-03-30 NOTE — PROGRESS NOTE ADULT - PROBLEM SELECTOR PLAN 6
- Dispo- home when medically cleared  - Hold DVT ppx pending ?melena workup above   - D/w family at bedside 3/29 updated on full plan of care
- Dispo- home when medically optimized   - Hold DVT ppx pending ?melena workup above   - D/w family at bedside 3/30 updated on full plan of care

## 2024-03-30 NOTE — PROGRESS NOTE ADULT - PROBLEM SELECTOR PLAN 1
- Found to have wall thickening on CT w/ fluid throughout colon c/w infectious colitis   - Stool cx + Salmonella, Campylobacter, EAEC, EPEC, ETEC  - C/w azithromycin till 3/30  - Pt still with fever episodes, pending ID follow up   - Tolerating regular diet
- Found to have wall thickening on CT w/ fluid throughout colon c/w infectious colitis   - Stool cx + Salmonella, Campylobacter, EAEC, EPEC, ETEC  - noted lactate up-trended to 4 , NS 100cc x12 hr, f/u repeat lactate   - C/w azithromycin (3/28-    ) , will extend course for now considering elevated lactate   - s/p ID eval, recs appreciated  - trend WBC   - Tolerating regular diet

## 2024-03-30 NOTE — PROGRESS NOTE ADULT - PROBLEM SELECTOR PLAN 3
- Infectious colitis c/b Stool cx + Salmonella, Campylobacter, EAEC, EPEC, ETEC  - Azithromycin as above   - CXR clear , RVP neg , no other localizing symptoms    - Blood cx ngtd  - Urine cx ngtd    - Improving
- Infectious colitis c/b Stool cx + Salmonella, Campylobacter, EAEC, EPEC, ETEC  - Azithromycin as above   - CXR clear , RVP neg , no other localizing symptoms    - Blood cx ngtd  - Urine cx ngtd    - Improving

## 2024-03-30 NOTE — PROGRESS NOTE ADULT - SUBJECTIVE AND OBJECTIVE BOX
PROGRESS NOTE:     Patient is a 68y old  Male who presents with a chief complaint of nausea vomiting and diarrhea x 1 day (29 Mar 2024 13:26)    Patient declined  service, request his son to translate in Kazakh at bedside.   SUBJECTIVE / OVERNIGHT EVENTS:  Patient seen and evaluated at bedside. Patient reports one episode of dark stool this morning, otherwise no abdominal pain, no nausea/vomiting, tolerating diet.     ADDITIONAL REVIEW OF SYSTEMS:    MEDICATIONS  (STANDING):  amLODIPine   Tablet 5 milliGRAM(s) Oral daily  azithromycin  IVPB 500 milliGRAM(s) IV Intermittent every 24 hours  azithromycin  IVPB      influenza  Vaccine (HIGH DOSE) 0.7 milliLiter(s) IntraMuscular once  potassium chloride    Tablet ER 40 milliEquivalent(s) Oral every 4 hours  simethicone 80 milliGRAM(s) Chew once  sodium chloride 0.9%. 1000 milliLiter(s) (100 mL/Hr) IV Continuous <Continuous>    MEDICATIONS  (PRN):  acetaminophen     Tablet .. 650 milliGRAM(s) Oral every 6 hours PRN Temp greater or equal to 38C (100.4F), Mild Pain (1 - 3)  melatonin 3 milliGRAM(s) Oral at bedtime PRN Insomnia  ondansetron Injectable 4 milliGRAM(s) IV Push every 8 hours PRN Nausea and/or Vomiting      CAPILLARY BLOOD GLUCOSE        I&O's Summary    29 Mar 2024 07:01  -  30 Mar 2024 07:00  --------------------------------------------------------  IN: 720 mL / OUT: 0 mL / NET: 720 mL        PHYSICAL EXAM:  Vital Signs Last 24 Hrs  T(C): 36.4 (30 Mar 2024 11:10), Max: 36.5 (30 Mar 2024 04:40)  T(F): 97.5 (30 Mar 2024 11:10), Max: 97.7 (30 Mar 2024 04:40)  HR: 73 (30 Mar 2024 11:10) (68 - 73)  BP: 121/75 (30 Mar 2024 11:10) (121/75 - 131/81)  BP(mean): --  RR: 18 (30 Mar 2024 11:10) (18 - 18)  SpO2: 97% (30 Mar 2024 11:10) (97% - 98%)    Parameters below as of 30 Mar 2024 11:10  Patient On (Oxygen Delivery Method): room air        CONSTITUTIONAL: NAD  RESPIRATORY: Normal respiratory effort; lungs are clear to auscultation bilaterally  CARDIOVASCULAR: Regular rate and rhythm, normal S1 and S2, no murmur/rub/gallop; No lower extremity edema  ABDOMEN: Nontender to palpation, normoactive bowel sounds, no rebound/guarding  PSYCH: A+O to person, place, and time; affect appropriate    LABS:                        16.7   3.93  )-----------( 218      ( 29 Mar 2024 14:28 )             47.7     03-30    139  |  104  |  12  ----------------------------<  119<H>  3.4<L>   |  15<L>  |  0.85    Ca    9.1      30 Mar 2024 07:09  Phos  2.6     03-30  Mg     2.1     03-30    TPro  6.8  /  Alb  4.0  /  TBili  0.5  /  DBili  <0.1  /  AST  50<H>  /  ALT  72<H>  /  AlkPhos  68  03-30          Urinalysis Basic - ( 30 Mar 2024 07:09 )    Color: x / Appearance: x / SG: x / pH: x  Gluc: 119 mg/dL / Ketone: x  / Bili: x / Urobili: x   Blood: x / Protein: x / Nitrite: x   Leuk Esterase: x / RBC: x / WBC x   Sq Epi: x / Non Sq Epi: x / Bacteria: x        Culture - Stool (collected 28 Mar 2024 04:41)  Source: .Stool Feces  Preliminary Report (29 Mar 2024 22:29):    Numerous Salmonella species    (Stool culture examined for Salmonella,    Shigella, Campylobacter, Aeromonas, Plesiomonas,    Vibrio, E.coli O157 and Yersinia)    Culture - Reflex Stool (collected 28 Mar 2024 04:41)  Source: .Stool  Preliminary Report (28 Mar 2024 17:26):    Culture in progress    Culture - Urine (collected 28 Mar 2024 02:34)  Source: Clean Catch Clean Catch (Midstream)  Final Report (29 Mar 2024 07:23):    No growth    Culture - Blood (collected 28 Mar 2024 00:50)  Source: .Blood Blood-Peripheral  Preliminary Report (30 Mar 2024 04:01):    No growth at 48 Hours    Culture - Blood (collected 28 Mar 2024 00:35)  Source: .Blood Blood-Peripheral  Preliminary Report (30 Mar 2024 04:01):    No growth at 48 Hours        RADIOLOGY & ADDITIONAL TESTS:  Results Reviewed:   Imaging Personally Reviewed:  Electrocardiogram Personally Reviewed:    COORDINATION OF CARE:  Care Discussed with Consultants/Other Providers [Y/N]:  Prior or Outpatient Records Reviewed [Y/N]:

## 2024-03-31 VITALS
RESPIRATION RATE: 18 BRPM | DIASTOLIC BLOOD PRESSURE: 75 MMHG | HEART RATE: 68 BPM | TEMPERATURE: 98 F | SYSTOLIC BLOOD PRESSURE: 137 MMHG | OXYGEN SATURATION: 98 %

## 2024-03-31 LAB
BASE EXCESS BLDV CALC-SCNC: -4.1 MMOL/L — LOW (ref -2–3)
BASOPHILS # BLD AUTO: 0 K/UL — SIGNIFICANT CHANGE UP (ref 0–0.2)
BASOPHILS NFR BLD AUTO: 0 % — SIGNIFICANT CHANGE UP (ref 0–2)
CA-I SERPL-SCNC: 1.23 MMOL/L — SIGNIFICANT CHANGE UP (ref 1.15–1.33)
CHLORIDE BLDV-SCNC: 104 MMOL/L — SIGNIFICANT CHANGE UP (ref 96–108)
CO2 BLDV-SCNC: 21 MMOL/L — LOW (ref 22–26)
CULTURE RESULTS: SIGNIFICANT CHANGE UP
EOSINOPHIL # BLD AUTO: 0.08 K/UL — SIGNIFICANT CHANGE UP (ref 0–0.5)
EOSINOPHIL NFR BLD AUTO: 1.8 % — SIGNIFICANT CHANGE UP (ref 0–6)
GAS PNL BLDV: 134 MMOL/L — LOW (ref 136–145)
GAS PNL BLDV: SIGNIFICANT CHANGE UP
GLUCOSE BLDV-MCNC: 93 MG/DL — SIGNIFICANT CHANGE UP (ref 70–99)
HCO3 BLDV-SCNC: 20 MMOL/L — LOW (ref 22–29)
HCT VFR BLD CALC: 44.7 % — SIGNIFICANT CHANGE UP (ref 39–50)
HCT VFR BLDA CALC: 47 % — SIGNIFICANT CHANGE UP (ref 39–51)
HGB BLD CALC-MCNC: 15.6 G/DL — SIGNIFICANT CHANGE UP (ref 12.6–17.4)
HGB BLD-MCNC: 15.6 G/DL — SIGNIFICANT CHANGE UP (ref 13–17)
LACTATE BLDV-MCNC: 1.9 MMOL/L — SIGNIFICANT CHANGE UP (ref 0.5–2)
LACTATE BLDV-MCNC: 2 MMOL/L — SIGNIFICANT CHANGE UP (ref 0.5–2)
LYMPHOCYTES # BLD AUTO: 1.86 K/UL — SIGNIFICANT CHANGE UP (ref 1–3.3)
LYMPHOCYTES # BLD AUTO: 40.7 % — SIGNIFICANT CHANGE UP (ref 13–44)
MANUAL SMEAR VERIFICATION: SIGNIFICANT CHANGE UP
MCHC RBC-ENTMCNC: 32 PG — SIGNIFICANT CHANGE UP (ref 27–34)
MCHC RBC-ENTMCNC: 34.9 GM/DL — SIGNIFICANT CHANGE UP (ref 32–36)
MCV RBC AUTO: 91.6 FL — SIGNIFICANT CHANGE UP (ref 80–100)
MONOCYTES # BLD AUTO: 0.26 K/UL — SIGNIFICANT CHANGE UP (ref 0–0.9)
MONOCYTES NFR BLD AUTO: 5.6 % — SIGNIFICANT CHANGE UP (ref 2–14)
MYELOCYTES NFR BLD: 1.9 % — HIGH (ref 0–0)
NEUTROPHILS # BLD AUTO: 2.2 K/UL — SIGNIFICANT CHANGE UP (ref 1.8–7.4)
NEUTROPHILS NFR BLD AUTO: 42.6 % — LOW (ref 43–77)
NEUTS BAND # BLD: 5.6 % — SIGNIFICANT CHANGE UP (ref 0–8)
PCO2 BLDV: 34 MMHG — LOW (ref 42–55)
PH BLDV: 7.38 — SIGNIFICANT CHANGE UP (ref 7.32–7.43)
PLAT MORPH BLD: NORMAL — SIGNIFICANT CHANGE UP
PLATELET # BLD AUTO: 275 K/UL — SIGNIFICANT CHANGE UP (ref 150–400)
PO2 BLDV: 82 MMHG — HIGH (ref 25–45)
POTASSIUM BLDV-SCNC: 4.3 MMOL/L — SIGNIFICANT CHANGE UP (ref 3.5–5.1)
RBC # BLD: 4.88 M/UL — SIGNIFICANT CHANGE UP (ref 4.2–5.8)
RBC # FLD: 12.6 % — SIGNIFICANT CHANGE UP (ref 10.3–14.5)
RBC BLD AUTO: NORMAL — SIGNIFICANT CHANGE UP
SAO2 % BLDV: 96.5 % — HIGH (ref 67–88)
SMUDGE CELLS # BLD: PRESENT — SIGNIFICANT CHANGE UP
SPECIMEN SOURCE: SIGNIFICANT CHANGE UP
VARIANT LYMPHS # BLD: 1.8 % — SIGNIFICANT CHANGE UP (ref 0–6)
WBC # BLD: 4.56 K/UL — SIGNIFICANT CHANGE UP (ref 3.8–10.5)
WBC # FLD AUTO: 4.56 K/UL — SIGNIFICANT CHANGE UP (ref 3.8–10.5)

## 2024-03-31 PROCEDURE — 85014 HEMATOCRIT: CPT

## 2024-03-31 PROCEDURE — 87328 CRYPTOSPORIDIUM AG IA: CPT

## 2024-03-31 PROCEDURE — 74177 CT ABD & PELVIS W/CONTRAST: CPT | Mod: MC

## 2024-03-31 PROCEDURE — 87507 IADNA-DNA/RNA PROBE TQ 12-25: CPT

## 2024-03-31 PROCEDURE — 96375 TX/PRO/DX INJ NEW DRUG ADDON: CPT

## 2024-03-31 PROCEDURE — 99239 HOSP IP/OBS DSCHRG MGMT >30: CPT

## 2024-03-31 PROCEDURE — 82947 ASSAY GLUCOSE BLOOD QUANT: CPT

## 2024-03-31 PROCEDURE — 85027 COMPLETE CBC AUTOMATED: CPT

## 2024-03-31 PROCEDURE — 84100 ASSAY OF PHOSPHORUS: CPT

## 2024-03-31 PROCEDURE — 85610 PROTHROMBIN TIME: CPT

## 2024-03-31 PROCEDURE — 87077 CULTURE AEROBIC IDENTIFY: CPT

## 2024-03-31 PROCEDURE — 83605 ASSAY OF LACTIC ACID: CPT

## 2024-03-31 PROCEDURE — 85730 THROMBOPLASTIN TIME PARTIAL: CPT

## 2024-03-31 PROCEDURE — 82803 BLOOD GASES ANY COMBINATION: CPT

## 2024-03-31 PROCEDURE — 87046 STOOL CULTR AEROBIC BACT EA: CPT

## 2024-03-31 PROCEDURE — 87045 FECES CULTURE AEROBIC BACT: CPT

## 2024-03-31 PROCEDURE — 71045 X-RAY EXAM CHEST 1 VIEW: CPT

## 2024-03-31 PROCEDURE — 83735 ASSAY OF MAGNESIUM: CPT

## 2024-03-31 PROCEDURE — 87086 URINE CULTURE/COLONY COUNT: CPT

## 2024-03-31 PROCEDURE — 84132 ASSAY OF SERUM POTASSIUM: CPT

## 2024-03-31 PROCEDURE — 99285 EMERGENCY DEPT VISIT HI MDM: CPT | Mod: 25

## 2024-03-31 PROCEDURE — 80076 HEPATIC FUNCTION PANEL: CPT

## 2024-03-31 PROCEDURE — 87637 SARSCOV2&INF A&B&RSV AMP PRB: CPT

## 2024-03-31 PROCEDURE — 87040 BLOOD CULTURE FOR BACTERIA: CPT

## 2024-03-31 PROCEDURE — 85018 HEMOGLOBIN: CPT

## 2024-03-31 PROCEDURE — 80048 BASIC METABOLIC PNL TOTAL CA: CPT

## 2024-03-31 PROCEDURE — 81001 URINALYSIS AUTO W/SCOPE: CPT

## 2024-03-31 PROCEDURE — 96374 THER/PROPH/DIAG INJ IV PUSH: CPT

## 2024-03-31 PROCEDURE — 82435 ASSAY OF BLOOD CHLORIDE: CPT

## 2024-03-31 PROCEDURE — 85025 COMPLETE CBC W/AUTO DIFF WBC: CPT

## 2024-03-31 PROCEDURE — 80053 COMPREHEN METABOLIC PANEL: CPT

## 2024-03-31 PROCEDURE — 87177 OVA AND PARASITES SMEARS: CPT

## 2024-03-31 PROCEDURE — 82330 ASSAY OF CALCIUM: CPT

## 2024-03-31 PROCEDURE — 84295 ASSAY OF SERUM SODIUM: CPT

## 2024-03-31 PROCEDURE — 86803 HEPATITIS C AB TEST: CPT

## 2024-03-31 RX ORDER — SIMETHICONE 80 MG/1
1 TABLET, CHEWABLE ORAL
Qty: 4 | Refills: 0
Start: 2024-03-31 | End: 2024-04-01

## 2024-03-31 RX ORDER — AMLODIPINE BESYLATE 2.5 MG/1
1 TABLET ORAL
Qty: 0 | Refills: 0 | DISCHARGE

## 2024-03-31 RX ADMIN — AZITHROMYCIN 255 MILLIGRAM(S): 500 TABLET, FILM COATED ORAL at 16:20

## 2024-03-31 RX ADMIN — AMLODIPINE BESYLATE 5 MILLIGRAM(S): 2.5 TABLET ORAL at 05:06

## 2024-03-31 NOTE — DISCHARGE NOTE PROVIDER - NSFOLLOWUPCLINICS_GEN_ALL_ED_FT
[facility] - Primary Care  Primary Care  [address] [place] [address]  Phone: [phone]  Fax:   Follow Up Time: 1 week

## 2024-03-31 NOTE — DISCHARGE NOTE PROVIDER - NSDCFUADDAPPT_GEN_ALL_CORE_FT
APPTS ARE READY TO BE MADE: [x ] YES    Best Family or Patient Contact (if needed):    Additional Information about above appointments (if needed):    1: Acute colitis- follow up with a Primary Care provider 1-2 weeks after discharge. You can follow up with the [facility] Primary Care clinic to establish primary care, please call to make an appointment.  2:   3:     Other comments or requests:

## 2024-03-31 NOTE — DISCHARGE NOTE PROVIDER - NSDCMRMEDTOKEN_GEN_ALL_CORE_FT
Norvasc 5 mg oral tablet: 1 tab(s) orally once a day  simethicone 80 mg oral tablet, chewable: 1 tab(s) orally 2 times a day (with meals)

## 2024-03-31 NOTE — CHART NOTE - NSCHARTNOTEFT_GEN_A_CORE
68 year old  Bolivian speaking male visiting from [facility] with pmh of HTN , HLD , presents for nausea vomiting and diarrhea over the past day. Found to have Infections colitis c/b Salmonella, Campylobacter, EAEC, EPEC, ETEC as well as ?melena    Patient seen and examined at bedside. States he feels well have been afebrile for >24 hrs. BM are now normal, h/h is stable      #Acute colitis.   - Found to have wall thickening on CT w/ fluid throughout colon c/w infectious colitis   - Stool cx + Salmonella, Campylobacter, EAEC, EPEC, ETEC  - noted lactate up-trended to 4 , NS 100cc x12 hr, f/u repeat lactate   - s/p azithromycin (3/28- 3/31)  - d/w Dr. Johnson douglas for d/c from ID stand point   - no evidence of GIB On my exam today     # EZEQUIEL (acute kidney injury).   Resolved    medically stable for d/c home today  Time spent 40 min     d/w Medicine CHELO Sweet 68 year old  Tanzanian speaking male visiting from [facility] with pmh of HTN , HLD , presents for nausea vomiting and diarrhea over the past day. Found to have Infections colitis c/b Salmonella, Campylobacter, EAEC, EPEC, ETEC as well as ?melena    Patient seen and examined at bedside. States he feels well have been afebrile for >24 hrs. BM are now normal, h/h is stable  Patient refused language lime , requested son Cosmo provide translation. Patient's son updated in detail     #Acute colitis.   - Found to have wall thickening on CT w/ fluid throughout colon c/w infectious colitis   - Stool cx + Salmonella, Campylobacter, EAEC, EPEC, ETEC  - noted lactate up-trended to 4 , NS 100cc x12 hr, f/u repeat lactate   - s/p azithromycin (3/28- 3/31)  - d/w Dr. Johnson douglas for d/c from ID stand point   - no evidence of GIB On my exam today     # EZEQUIEL (acute kidney injury).   Resolved    medically stable for d/c home today  Time spent 40 min     d/w Medicine ACP Kee

## 2024-03-31 NOTE — DISCHARGE NOTE PROVIDER - HOSPITAL COURSE
HPI:  Patient declined to use phone  , preferred son provides history and translation  Patient is a 68 year old  Congolese speaking male visiting from [facility] with pmh of HTN , HLD , presents for nausea vomiting and diarrhea over the past day.   Per son,   over the past day patient has been having intermittent green non bloody  foul smelling watery diarrhea  associated with poor appetite , decreased oral intake, abdominal cramping and bloating relieved with bowel movement , he also has a few episodes of non bloody emesis during this time. Patient also reports fever and chills as well as lightheadedness. He reports no sick contacts. He recently arrived from [facility] on 3/24.  He had a colonoscopy about 4 months ago reportedly without significant abnormalities.    (28 Mar 2024 04:27)    Hospital Course:  Found to have wall thickening on CT A/P with fluid throughout colon, consistent with infectious colitis. GI PCR and stool culture positive for Salmonella, Campylobacter, EAEC, EPEC, ETEC, initially treated with Zosyn. Infectious Disease and GI were consulted for infectious colitis. Abx switched to IV Azithromycin to complete a 4 day course. Lactate elevated to 4, initially had EZEQUIEL, resolved with IV hydration. Simethicone added by GI for abdominal discomfort/gas. Patient has been afebrile for >24 hrs. BM are now normal, h/h is stable by the time of discharge. Discharge plan with medication reconciliation discussed with attending Dr. Galvez. Patient is medically cleared for discharge home with outpatient Primary Care follow up.    Important/Pending Issues for Follow Up:  Acute colitis- Primary Care follow up    Medication Changes and Reason:  Simethicone added for colitis    Advance Directives:  [x] Full code [ ] Hospice [ ] DNR    Discharge Diagnoses:  Acute colitis HPI:  Patient declined to use phone  , preferred son provides history and translation  Patient is a 68 year old  Chinese speaking male visiting from [facility] with pmh of HTN , HLD , presents for nausea vomiting and diarrhea over the past day.   Per son,   over the past day patient has been having intermittent green non bloody  foul smelling watery diarrhea  associated with poor appetite , decreased oral intake, abdominal cramping and bloating relieved with bowel movement , he also has a few episodes of non bloody emesis during this time. Patient also reports fever and chills as well as lightheadedness. He reports no sick contacts. He recently arrived from [facility] on 3/24.  He had a colonoscopy about 4 months ago reportedly without significant abnormalities.    (28 Mar 2024 04:27)    Hospital Course:  Found to have wall thickening on CT A/P with fluid throughout colon, consistent with infectious colitis. GI PCR and stool culture positive for Salmonella, Campylobacter, EAEC, EPEC, ETEC, initially treated with Zosyn. Infectious Disease and GI were consulted for infectious colitis. Abx switched to IV Azithromycin to complete a 4 day course. Lactate elevated to 4, initially had EZEQUIEL, resolved with IV hydration. Simethicone added by GI for abdominal discomfort/gas. Patient has been afebrile for >24 hrs. BM are now normal, h/h is stable by the time of discharge. Discharge plan with medication reconciliation discussed with attending Dr. Galvez. Patient is medically cleared for discharge home with outpatient Primary Care follow up    Important/Pending Issues for Follow Up:  Acute colitis- Primary Care follow up    Medication Changes and Reason:  Simethicone added for colitis    Advance Directives:  [x] Full code [ ] Hospice [ ] DNR    Discharge Diagnoses:  Acute colitis

## 2024-03-31 NOTE — DISCHARGE NOTE NURSING/CASE MANAGEMENT/SOCIAL WORK - NSDCPEFALRISK_GEN_ALL_CORE
For information on Fall & Injury Prevention, visit: [email or web address] OR  [email or web address] OR  https://www.cdc.gov/steadi/patient.html

## 2024-03-31 NOTE — DISCHARGE NOTE NURSING/CASE MANAGEMENT/SOCIAL WORK - PATIENT PORTAL LINK FT
You can access the FollowMyHealth Patient Portal offered by [facility] by registering at the following website: [email or web address]. By joining [facility]’s FollowMyHealth portal, you will also be able to view your health information using other applications (apps) compatible with our system.

## 2024-03-31 NOTE — DISCHARGE NOTE NURSING/CASE MANAGEMENT/SOCIAL WORK - NSDCFUADDAPPT_GEN_ALL_CORE_FT
APPTS ARE READY TO BE MADE: [x ] YES    Best Family or Patient Contact (if needed):    Additional Information about above appointments (if needed):    1: Acute colitis- follow up with a Primary Care provider 1-2 weeks after discharge. You can follow up with the [facility] Primary Care clinic to establish primary care, please call to make an appointment.  2:   3:     Other comments or requests:

## 2024-03-31 NOTE — DISCHARGE NOTE PROVIDER - NSDCCPCAREPLAN_GEN_ALL_CORE_FT
PRINCIPAL DISCHARGE DIAGNOSIS  Diagnosis: Acute colitis  Assessment and Plan of Treatment: A CT scan of your abdomen showed you had infectious colitis. You were treated with IV antibiotics while in the hospital (Zosyn, Azithromycin). You no longer require antibiotics. Continue Simethicone with meals for 2 days to relieve stomach discomfort. Follow up with a Primary Care Provider 1-2 weeks after discharge. You can follow up with the [facility] Primary Care clinic.

## 2024-04-01 LAB
CULTURE RESULTS: SIGNIFICANT CHANGE UP
SPECIMEN SOURCE: SIGNIFICANT CHANGE UP

## 2024-04-02 LAB
CULTURE RESULTS: SIGNIFICANT CHANGE UP
CULTURE RESULTS: SIGNIFICANT CHANGE UP
SPECIMEN SOURCE: SIGNIFICANT CHANGE UP
SPECIMEN SOURCE: SIGNIFICANT CHANGE UP

## 2024-06-17 NOTE — H&P ADULT - PROBLEM SELECTOR PLAN 1
"Goal Outcome Evaluation:  Pertinent assessments: Alert to self only. VSS. On RA. Speech is incoherent. Neuro checks intact except for orientation. Disorganized thought. Rambles. Became agitated and upset towards the end of shift. PRN Zyprexa given. SBA. Sitter at bedside. On Regular diet & tolerating well.     Major Shift Events: PRN Zyprexa given x1.      Treatment Plan: Neurology following, Pschy consulted gain    Plan of Care Reviewed With: patient  Overall Patient Progress: no changeOverall Patient Progress: no change  Outcome Evaluation: Confused, easly redirectable, sitter at bedside  Problem: Adult Inpatient Plan of Care  Goal: Plan of Care Review  Description: The Plan of Care Review/Shift note should be completed every shift.  The Outcome Evaluation is a brief statement about your assessment that the patient is improving, declining, or no change.  This information will be displayed automatically on your shift  note.  6/17/2024 1445 by Katarzyna Oneal RN  Outcome: Progressing  Flowsheets (Taken 6/17/2024 1445)  Outcome Evaluation: Confused, easly redirectable, sitter at bedside  Plan of Care Reviewed With: patient  Overall Patient Progress: no change  6/17/2024 1445 by Katarzyna Oneal RN  Outcome: Progressing  Flowsheets (Taken 6/17/2024 1445)  Outcome Evaluation: Confused, easly redirectable, sitter at bedside  Plan of Care Reviewed With: patient  Overall Patient Progress: no change  Goal: Patient-Specific Goal (Individualized)  Description: You can add care plan individualizations to a care plan. Examples of Individualization might be:  \"Parent requests to be called daily at 9am for status\", \"I have a hard time hearing out of my right ear\", or \"Do not touch me to wake me up as it startles  me\".  6/17/2024 1445 by Katarzyna Oneal RN  Outcome: Progressing  6/17/2024 1445 by Katarzyna Oneal RN  Outcome: Progressing  Goal: Absence of Hospital-Acquired Illness or Injury  6/17/2024 1445 by Katarzyna Oneal, " RN  Outcome: Progressing  6/17/2024 1445 by Katarzyna Oneal RN  Outcome: Progressing  Intervention: Identify and Manage Fall Risk  Recent Flowsheet Documentation  Taken 6/17/2024 0952 by Katarzyna Oneal RN  Safety Promotion/Fall Prevention:   activity supervised   assistive device/personal items within reach   increased rounding and observation  Intervention: Prevent and Manage VTE (Venous Thromboembolism) Risk  Recent Flowsheet Documentation  Taken 6/17/2024 0952 by Katarzyna Oneal RN  VTE Prevention/Management: compression stockings off  Intervention: Prevent Infection  Recent Flowsheet Documentation  Taken 6/17/2024 0952 by Katarzyna Oneal RN  Infection Prevention:   environmental surveillance performed   hand hygiene promoted   personal protective equipment utilized  Goal: Optimal Comfort and Wellbeing  6/17/2024 1445 by Katarzyna Oneal RN  Outcome: Progressing  6/17/2024 1445 by Katarzyna Oneal RN  Outcome: Progressing  Goal: Readiness for Transition of Care  6/17/2024 1445 by Katarzyna Oneal RN  Outcome: Progressing  6/17/2024 1445 by Katarzyna Oneal RN  Outcome: Progressing     Problem: Electrolyte Imbalance  Goal: Electrolyte Balance  6/17/2024 1445 by Katarzyna Oneal, RN  Outcome: Progressing  6/17/2024 1445 by Katarzyna Oneal RN  Outcome: Progressing     Problem: Fatigue  Goal: Improved Activity Tolerance  6/17/2024 1445 by Katarzyna Oneal, RN  Outcome: Progressing  6/17/2024 1445 by Katarzyna Oneal, RN  Outcome: Progressing     Problem: Fall Injury Risk  Goal: Absence of Fall and Fall-Related Injury  6/17/2024 1445 by Katarzyna Oneal, RN  Outcome: Progressing  6/17/2024 1445 by Katarzyna Oneal RN  Outcome: Progressing  Intervention: Identify and Manage Contributors  Recent Flowsheet Documentation  Taken 6/17/2024 0952 by Katarzyna Oneal RN  Medication Review/Management: medications reviewed  Intervention: Promote Injury-Free Environment  Recent Flowsheet Documentation  Taken 6/17/2024 0952 by Katarzyna Oneal RN  Safety  Promotion/Fall Prevention:   activity supervised   assistive device/personal items within reach   increased rounding and observation      wall thickening on CT w/ fluid throughout colon c/w infectious colitis , given septic findings agree with broad spectrum ABX   - c/w Zosyn   - GI pcr   - stool culture and O&P   - replacement fluids  - advance diet as tolerated